# Patient Record
Sex: MALE | Race: WHITE | NOT HISPANIC OR LATINO | Employment: FULL TIME | URBAN - METROPOLITAN AREA
[De-identification: names, ages, dates, MRNs, and addresses within clinical notes are randomized per-mention and may not be internally consistent; named-entity substitution may affect disease eponyms.]

---

## 2023-05-02 PROBLEM — N52.9 ERECTILE DYSFUNCTION: Status: ACTIVE | Noted: 2023-05-02

## 2023-05-02 PROBLEM — I10 HYPERTENSION: Status: ACTIVE | Noted: 2023-05-02

## 2023-05-02 PROBLEM — E55.9 VITAMIN D DEFICIENCY: Status: ACTIVE | Noted: 2023-05-02

## 2023-05-02 PROBLEM — F41.9 ANXIETY DISORDER: Status: ACTIVE | Noted: 2023-05-02

## 2023-05-02 PROBLEM — E78.1 HYPERTRIGLYCERIDEMIA: Status: ACTIVE | Noted: 2023-05-02

## 2023-05-02 PROBLEM — G47.33 OSA (OBSTRUCTIVE SLEEP APNEA): Status: ACTIVE | Noted: 2023-05-02

## 2023-05-02 PROBLEM — J30.9 ALLERGIC RHINITIS: Status: ACTIVE | Noted: 2023-05-02

## 2023-05-02 PROBLEM — F41.0 PANIC ATTACKS: Status: ACTIVE | Noted: 2023-05-02

## 2023-05-02 RX ORDER — CHOLECALCIFEROL (VITAMIN D3) 125 MCG
125 CAPSULE ORAL DAILY
COMMUNITY
Start: 2018-05-31

## 2023-05-02 RX ORDER — AMLODIPINE BESYLATE 10 MG/1
10 TABLET ORAL DAILY
COMMUNITY
End: 2023-05-09 | Stop reason: SDUPTHER

## 2023-05-02 RX ORDER — SILDENAFIL 100 MG/1
100 TABLET, FILM COATED ORAL
COMMUNITY
End: 2023-05-08 | Stop reason: SDUPTHER

## 2023-05-02 RX ORDER — HYDROCHLOROTHIAZIDE 12.5 MG/1
12.5 TABLET ORAL DAILY
COMMUNITY
End: 2023-11-08

## 2023-05-02 RX ORDER — ALPRAZOLAM 0.5 MG/1
0.5 TABLET ORAL 2 TIMES DAILY
COMMUNITY
End: 2023-05-04 | Stop reason: SDUPTHER

## 2023-05-02 RX ORDER — AZELASTINE 1 MG/ML
SPRAY, METERED NASAL
COMMUNITY
Start: 2017-01-03

## 2023-05-02 RX ORDER — SERTRALINE HYDROCHLORIDE 100 MG/1
100 TABLET, FILM COATED ORAL DAILY
COMMUNITY
Start: 2019-08-07 | End: 2024-01-24 | Stop reason: SDUPTHER

## 2023-05-02 RX ORDER — METOPROLOL SUCCINATE 100 MG/1
100 TABLET, EXTENDED RELEASE ORAL DAILY
COMMUNITY
Start: 2017-01-03 | End: 2024-01-24 | Stop reason: SDUPTHER

## 2023-05-02 RX ORDER — LISINOPRIL 20 MG/1
20 TABLET ORAL DAILY
COMMUNITY
End: 2023-07-31 | Stop reason: SDUPTHER

## 2023-05-04 ENCOUNTER — OFFICE VISIT (OUTPATIENT)
Dept: PRIMARY CARE | Facility: CLINIC | Age: 43
End: 2023-05-04
Payer: COMMERCIAL

## 2023-05-04 VITALS
HEIGHT: 69 IN | DIASTOLIC BLOOD PRESSURE: 68 MMHG | BODY MASS INDEX: 39.25 KG/M2 | OXYGEN SATURATION: 96 % | HEART RATE: 69 BPM | WEIGHT: 265 LBS | SYSTOLIC BLOOD PRESSURE: 124 MMHG

## 2023-05-04 DIAGNOSIS — F41.1 GENERALIZED ANXIETY DISORDER: Primary | ICD-10-CM

## 2023-05-04 DIAGNOSIS — F41.0 PANIC ATTACKS: ICD-10-CM

## 2023-05-04 PROCEDURE — 99214 OFFICE O/P EST MOD 30 MIN: CPT | Performed by: INTERNAL MEDICINE

## 2023-05-04 PROCEDURE — 3074F SYST BP LT 130 MM HG: CPT | Performed by: INTERNAL MEDICINE

## 2023-05-04 PROCEDURE — 3078F DIAST BP <80 MM HG: CPT | Performed by: INTERNAL MEDICINE

## 2023-05-04 RX ORDER — ALPRAZOLAM 0.5 MG/1
0.5 TABLET ORAL 2 TIMES DAILY
Qty: 180 TABLET | Refills: 0 | Status: SHIPPED | OUTPATIENT
Start: 2023-05-04 | End: 2023-07-31 | Stop reason: SDUPTHER

## 2023-05-04 ASSESSMENT — ENCOUNTER SYMPTOMS
DYSURIA: 0
LIGHT-HEADEDNESS: 0
HEMATURIA: 0
CONSTIPATION: 0
ARTHRALGIAS: 0
WEAKNESS: 0
DIZZINESS: 0
HEADACHES: 0
FATIGUE: 0
PALPITATIONS: 0
DIFFICULTY URINATING: 0
FREQUENCY: 0
NAUSEA: 0
SHORTNESS OF BREATH: 0
MYALGIAS: 0
SORE THROAT: 0
CHILLS: 0
COUGH: 0
FEVER: 0
VOMITING: 0
DIARRHEA: 0

## 2023-05-04 ASSESSMENT — PATIENT HEALTH QUESTIONNAIRE - PHQ9
1. LITTLE INTEREST OR PLEASURE IN DOING THINGS: NOT AT ALL
2. FEELING DOWN, DEPRESSED OR HOPELESS: NOT AT ALL
SUM OF ALL RESPONSES TO PHQ9 QUESTIONS 1 AND 2: 0

## 2023-05-04 ASSESSMENT — PAIN SCALES - GENERAL: PAINLEVEL: 0-NO PAIN

## 2023-05-04 NOTE — ASSESSMENT & PLAN NOTE
Patient has had generalized anxiety disorder for multiple years.  He is doing well with the alprazolam and after checking the state database a new controlled substance agreement was signed today and he will entered into the same agreement for alprazolam 0.5 twice daily.  I will check a urinalysis or urine screen at a later date when he is not expecting it.

## 2023-05-04 NOTE — PROGRESS NOTES
OARRS:  No data recorded  I have personally reviewed the OARRS report for Obed Uribe. I have considered the risks of abuse, dependence, addiction and diversion    Is the patient prescribed a combination of a benzodiazepine and opioid?  Yes, I feel it is clincially indicated to continue the medication and have discussed with the patient risks/benefits/alternatives.    Last Urine Drug Screen / ordered today: No  Recent Results (from the past 12540 hour(s))   Benzodiazepine Confirmation, Urine    Collection Time: 09/14/22  3:50 PM   Result Value Ref Range    7-Aminoclonazepam <25 Cutoff <25 ng/mL    Alpha-Hydroxyalprazolam 60 (A) Cutoff <25 ng/mL    Alpha-Hydroxymidazolam <25 Cutoff <25 ng/mL    Alprazolam <25 Cutoff <25 ng/mL    Chlordiazepoxide <25 Cutoff <25 ng/mL    Clonazepam <25 Cutoff <25 ng/mL    Diazepam <25 Cutoff <25 ng/mL    Lorazepam <25 Cutoff <25 ng/mL    Midazolam <25 Cutoff <25 ng/mL    Nordiazepam <25 Cutoff <25 ng/mL    Oxazepam <25 Cutoff <25 ng/mL    Temazepam <25 Cutoff <25 ng/mL   Drug Screen, Urine With Reflex to Confirmation    Collection Time: 09/14/22  3:50 PM   Result Value Ref Range    DRUG SCREEN COMMENT URINE SEE BELOW     Amphetamine Screen, Urine PRESUMPTIVE NEGATIVE NEGATIVE    Barbiturate Screen, Urine PRESUMPTIVE NEGATIVE NEGATIVE    BENZODIAZEPINE (PRESENCE) IN URINE BY SCREEN METHOD PRESUMPTIVE POSITIVE (A) NEGATIVE    Cannabinoid Screen, Urine PRESUMPTIVE NEGATIVE NEGATIVE    Cocaine Screen, Urine PRESUMPTIVE NEGATIVE NEGATIVE    Fentanyl, Ur PRESUMPTIVE NEGATIVE NEGATIVE    Methadone Screen, Urine PRESUMPTIVE NEGATIVE NEGATIVE    Opiate Screen, Urine PRESUMPTIVE NEGATIVE NEGATIVE    Oxycodone Screen, Ur PRESUMPTIVE NEGATIVE NEGATIVE    PCP Screen, Urine PRESUMPTIVE NEGATIVE NEGATIVE     N/A    Controlled Substance Agreement:  Date of the Last Agreement: 5/4/2023  Reviewed Controlled Substance Agreement including but not limited to the benefits, risks, and alternatives to  treatment with a Controlled Substance medication(s).    Benzodiazepines:  What is the patient's goal of therapy? To stay calm and productive  Is this being achieved with current treatment? yes    LAMINE-7:  No data recorded    Activities of Daily Living:   Is your overall impression that this patient is benefiting (symptom reduction outweighs side effects) from benzodiazepine therapy? Yes     1. Physical Functioning: Same  2. Family Relationship: Better  3. Social Relationship: Better  4. Mood: Better  5. Sleep Patterns: Better  6. Overall Function: BetterSubjective   Patient ID: Obed Uribe is a 43 y.o. male who presents for CSA refill and general health management.  BN    Patient is here for refill on his alprazolam.  He is in need of a new controlled substance agreement as his has .  I have elected to not check the urine this visit but we will check it at a later visit when he is not expecting it.    Patient has been stable on alprazolam 0.5 mg twice daily for several years before his generalized anxiety disorder and is working well for him.        Review of Systems   Constitutional:  Negative for chills, fatigue and fever.   HENT:  Negative for sore throat.    Eyes:  Negative for visual disturbance.   Respiratory:  Negative for cough and shortness of breath.    Cardiovascular:  Negative for chest pain, palpitations and leg swelling.   Gastrointestinal:  Negative for constipation, diarrhea, nausea and vomiting.   Genitourinary:  Negative for difficulty urinating, dysuria, frequency, hematuria and urgency.   Musculoskeletal:  Negative for arthralgias and myalgias.   Skin:  Negative for rash.   Neurological:  Negative for dizziness, syncope, weakness, light-headedness and headaches.       Objective   Medication Documentation Review Audit       Reviewed by Di Villanueva MA (Medical Assistant) on 23 at 1813      Medication Order Taking? Sig Documenting Provider Last Dose Status   ALPRAZolam (Xanax) 0.5  "mg tablet [id number]  Take 1 tablet (0.5 mg) by mouth 2 times a day. Historical Provider, MD  Active   amLODIPine (Norvasc) 10 mg tablet [id number]  Take 1 tablet (10 mg) by mouth once daily. as directed Historical Provider, MD  Active   azelastine (Astelin) 137 mcg (0.1 %) nasal spray [id number] Yes Administer into affected nostril(s). Historical Provider, MD  Active   cholecalciferol (Vitamin D-3) 125 MCG (5000 UT) capsule [id number] Yes Take 1 capsule (125 mcg) by mouth once daily. Historical Provider, MD  Active   hydroCHLOROthiazide (HYDRODiuril) 12.5 mg tablet [id number]  Take 1 tablet (12.5 mg) by mouth once daily. Historical Provider, MD  Active   lisinopril 20 mg tablet [id number]  Take 1 tablet (20 mg) by mouth once daily. Historical Provider, MD  Active   metoprolol succinate XL (Toprol-XL) 100 mg 24 hr tablet [id number] Yes Take 1 tablet (100 mg) by mouth once daily. Historical Provider, MD  Active   sertraline (Zoloft) 100 mg tablet [id number] Yes Take 1 tablet (100 mg) by mouth once daily. Historical Provider, MD  Active   sildenafil (Viagra) 100 mg tablet [id number]  1 tablet (100 mg). Historical Provider, MD  Active                  Physical Exam  Constitutional:       Appearance: Normal appearance.   HENT:      Head: Normocephalic and atraumatic.      Nose: Nose normal.   Eyes:      Extraocular Movements: Extraocular movements intact.      Pupils: Pupils are equal, round, and reactive to light.   Cardiovascular:      Rate and Rhythm: Normal rate and regular rhythm.   Pulmonary:      Breath sounds: Normal breath sounds.   Abdominal:      General: Abdomen is flat. Bowel sounds are normal.      Palpations: Abdomen is soft.   Musculoskeletal:      Right lower leg: No edema.      Left lower leg: No edema.   Neurological:      Mental Status: He is alert.     /77 (BP Location: Left arm, Patient Position: Sitting)   Pulse 69   Ht 1.753 m (5' 9\")   Wt 120 kg (265 lb)   SpO2 96%   BMI 39.13 kg/m² "       Assessment/Plan   Problem List Items Addressed This Visit    None             It has been a pleasure seeing you.

## 2023-05-06 DIAGNOSIS — N52.9 ERECTILE DYSFUNCTION, UNSPECIFIED ERECTILE DYSFUNCTION TYPE: ICD-10-CM

## 2023-05-08 DIAGNOSIS — N52.9 ERECTILE DYSFUNCTION, UNSPECIFIED ERECTILE DYSFUNCTION TYPE: Primary | ICD-10-CM

## 2023-05-08 RX ORDER — SILDENAFIL 100 MG/1
100 TABLET, FILM COATED ORAL AS NEEDED
Qty: 10 TABLET | Refills: 11 | Status: SHIPPED | OUTPATIENT
Start: 2023-05-08 | End: 2024-05-15

## 2023-05-08 RX ORDER — SILDENAFIL 100 MG/1
TABLET, FILM COATED ORAL
Qty: 9 TABLET | Refills: 0 | OUTPATIENT
Start: 2023-05-08

## 2023-05-08 NOTE — TELEPHONE ENCOUNTER
----- Message from Obed Uribe sent at 2023 12:02 PM EDT -----  Regarding: Sildenafil  Contact: [phone]  Hi Dr. Mancia,   Would you send in a new prescription for sildenafil 100mg to [facility] in [place]? I had a refill available still, but the script was  after a year. Thank you!   Obed Uribe

## 2023-05-09 DIAGNOSIS — I10 HYPERTENSION, UNSPECIFIED TYPE: ICD-10-CM

## 2023-05-09 RX ORDER — AMLODIPINE BESYLATE 10 MG/1
10 TABLET ORAL DAILY
Qty: 30 TABLET | Refills: 0 | Status: SHIPPED | OUTPATIENT
Start: 2023-05-09 | End: 2023-06-27 | Stop reason: SDUPTHER

## 2023-06-27 ENCOUNTER — PATIENT MESSAGE (OUTPATIENT)
Dept: PRIMARY CARE | Facility: CLINIC | Age: 43
End: 2023-06-27

## 2023-06-27 DIAGNOSIS — I10 HYPERTENSION, UNSPECIFIED TYPE: ICD-10-CM

## 2023-06-27 RX ORDER — AMLODIPINE BESYLATE 10 MG/1
10 TABLET ORAL DAILY
Qty: 30 TABLET | Refills: 0 | Status: SHIPPED | OUTPATIENT
Start: 2023-06-27 | End: 2023-07-31 | Stop reason: SDUPTHER

## 2023-06-27 NOTE — TELEPHONE ENCOUNTER
----- Message from Obed Uribe sent at 6/27/2023  1:30 PM EDT -----  Regarding: Amlodipine   Contact: [phone]  Hi Dr. Mancia!   I need another refill of Amlodipine. I have 2 doses left. Usually I get a 90 day supply but the last one was only for 30 days. Also, I will have to reschedule my appointment for July 27. When I made the appointment I didn’t even think about the fact that we will be on our [place] vacation in [place] from July 21-28. So I just need to move it by a day or two after I get back. But I’ll call the office to schedule that. Thank you!   Obed

## 2023-06-27 NOTE — TELEPHONE ENCOUNTER
Patient was also transferred to [facility] To reschedule his appointment on 7/27 due to being on vacation.      Patien need a refill for:    Amlodipine 10 mg 1 tab daily    90 days    [facility] [place]  BN

## 2023-07-27 ENCOUNTER — APPOINTMENT (OUTPATIENT)
Dept: PRIMARY CARE | Facility: CLINIC | Age: 43
End: 2023-07-27
Payer: COMMERCIAL

## 2023-07-31 ENCOUNTER — LAB (OUTPATIENT)
Dept: LAB | Facility: LAB | Age: 43
End: 2023-07-31
Payer: COMMERCIAL

## 2023-07-31 ENCOUNTER — OFFICE VISIT (OUTPATIENT)
Dept: PRIMARY CARE | Facility: CLINIC | Age: 43
End: 2023-07-31
Payer: COMMERCIAL

## 2023-07-31 VITALS
BODY MASS INDEX: 39.46 KG/M2 | HEART RATE: 74 BPM | SYSTOLIC BLOOD PRESSURE: 144 MMHG | DIASTOLIC BLOOD PRESSURE: 86 MMHG | OXYGEN SATURATION: 96 % | WEIGHT: 266.4 LBS | HEIGHT: 69 IN

## 2023-07-31 DIAGNOSIS — F41.1 GENERALIZED ANXIETY DISORDER: ICD-10-CM

## 2023-07-31 DIAGNOSIS — F41.0 PANIC ATTACKS: Primary | ICD-10-CM

## 2023-07-31 DIAGNOSIS — I10 HYPERTENSION, UNSPECIFIED TYPE: ICD-10-CM

## 2023-07-31 PROCEDURE — 3077F SYST BP >= 140 MM HG: CPT | Performed by: INTERNAL MEDICINE

## 2023-07-31 PROCEDURE — 3079F DIAST BP 80-89 MM HG: CPT | Performed by: INTERNAL MEDICINE

## 2023-07-31 PROCEDURE — 80358 DRUG SCREENING METHADONE: CPT

## 2023-07-31 PROCEDURE — 80346 BENZODIAZEPINES1-12: CPT

## 2023-07-31 PROCEDURE — 80368 SEDATIVE HYPNOTICS: CPT

## 2023-07-31 PROCEDURE — 80307 DRUG TEST PRSMV CHEM ANLYZR: CPT

## 2023-07-31 PROCEDURE — 80361 OPIATES 1 OR MORE: CPT

## 2023-07-31 PROCEDURE — 80373 DRUG SCREENING TRAMADOL: CPT

## 2023-07-31 PROCEDURE — 3080F DIAST BP >= 90 MM HG: CPT | Performed by: INTERNAL MEDICINE

## 2023-07-31 PROCEDURE — 80365 DRUG SCREENING OXYCODONE: CPT

## 2023-07-31 PROCEDURE — 99214 OFFICE O/P EST MOD 30 MIN: CPT | Performed by: INTERNAL MEDICINE

## 2023-07-31 PROCEDURE — 80354 DRUG SCREENING FENTANYL: CPT

## 2023-07-31 RX ORDER — LISINOPRIL 20 MG/1
20 TABLET ORAL DAILY
Qty: 90 TABLET | Refills: 3 | Status: SHIPPED | OUTPATIENT
Start: 2023-07-31

## 2023-07-31 RX ORDER — ALPRAZOLAM 0.5 MG/1
0.5 TABLET ORAL 2 TIMES DAILY
Qty: 180 TABLET | Refills: 0 | Status: SHIPPED | OUTPATIENT
Start: 2023-07-31 | End: 2023-10-23 | Stop reason: SDUPTHER

## 2023-07-31 RX ORDER — AMLODIPINE BESYLATE 10 MG/1
10 TABLET ORAL DAILY
Qty: 90 TABLET | Refills: 3 | Status: SHIPPED | OUTPATIENT
Start: 2023-07-31 | End: 2023-10-23 | Stop reason: SDUPTHER

## 2023-07-31 ASSESSMENT — ENCOUNTER SYMPTOMS
SORE THROAT: 0
DYSURIA: 0
HEADACHES: 0
DIFFICULTY URINATING: 0
HEMATURIA: 0
PALPITATIONS: 0
NAUSEA: 0
COUGH: 0
FREQUENCY: 0
CONSTIPATION: 0
LIGHT-HEADEDNESS: 0
ARTHRALGIAS: 0
DIZZINESS: 0
DIARRHEA: 0
FEVER: 0
MYALGIAS: 0
VOMITING: 0
SHORTNESS OF BREATH: 0
CHILLS: 0
WEAKNESS: 0
FATIGUE: 0

## 2023-07-31 ASSESSMENT — PAIN SCALES - GENERAL: PAINLEVEL: 0-NO PAIN

## 2023-07-31 ASSESSMENT — PATIENT HEALTH QUESTIONNAIRE - PHQ9
SUM OF ALL RESPONSES TO PHQ9 QUESTIONS 1 AND 2: 0
2. FEELING DOWN, DEPRESSED OR HOPELESS: NOT AT ALL
1. LITTLE INTEREST OR PLEASURE IN DOING THINGS: NOT AT ALL

## 2023-07-31 NOTE — PROGRESS NOTES
OARRS:  Audrey Mancia MD on 7/31/2023  2:28 PM  I have personally reviewed the OARRS report for Obed Uribe. I have considered the risks of abuse, dependence, addiction and diversion    Is the patient prescribed a combination of a benzodiazepine and opioid?  No    Last Urine Drug Screen / ordered today: Yes  Recent Results (from the past 57658 hour(s))   Benzodiazepine Confirmation, Urine    Collection Time: 09/14/22  3:50 PM   Result Value Ref Range    7-Aminoclonazepam <25 Cutoff <25 ng/mL    Alpha-Hydroxyalprazolam 60 (A) Cutoff <25 ng/mL    Alpha-Hydroxymidazolam <25 Cutoff <25 ng/mL    Alprazolam <25 Cutoff <25 ng/mL    Chlordiazepoxide <25 Cutoff <25 ng/mL    Clonazepam <25 Cutoff <25 ng/mL    Diazepam <25 Cutoff <25 ng/mL    Lorazepam <25 Cutoff <25 ng/mL    Midazolam <25 Cutoff <25 ng/mL    Nordiazepam <25 Cutoff <25 ng/mL    Oxazepam <25 Cutoff <25 ng/mL    Temazepam <25 Cutoff <25 ng/mL   Drug Screen, Urine With Reflex to Confirmation    Collection Time: 09/14/22  3:50 PM   Result Value Ref Range    DRUG SCREEN COMMENT URINE SEE BELOW     Amphetamine Screen, Urine PRESUMPTIVE NEGATIVE NEGATIVE    Barbiturate Screen, Urine PRESUMPTIVE NEGATIVE NEGATIVE    BENZODIAZEPINE (PRESENCE) IN URINE BY SCREEN METHOD PRESUMPTIVE POSITIVE (A) NEGATIVE    Cannabinoid Screen, Urine PRESUMPTIVE NEGATIVE NEGATIVE    Cocaine Screen, Urine PRESUMPTIVE NEGATIVE NEGATIVE    Fentanyl, Ur PRESUMPTIVE NEGATIVE NEGATIVE    Methadone Screen, Urine PRESUMPTIVE NEGATIVE NEGATIVE    Opiate Screen, Urine PRESUMPTIVE NEGATIVE NEGATIVE    Oxycodone Screen, Ur PRESUMPTIVE NEGATIVE NEGATIVE    PCP Screen, Urine PRESUMPTIVE NEGATIVE NEGATIVE     Results are as expected.     Controlled Substance Agreement:  Date of the Last Agreement: 5/4/23  Reviewed Controlled Substance Agreement including but not limited to the benefits, risks, and alternatives to treatment with a Controlled Substance medication(s).    Benzodiazepines:  What is the  patient's goal of therapy? To stay calm  Is this being achieved with current treatment? yes    LAMINE-7:  No data recorded    Activities of Daily Living:   Is your overall impression that this patient is benefiting (symptom reduction outweighs side effects) from benzodiazepine therapy? Yes     1. Physical Functioning: Same  2. Family Relationship: Better  3. Social Relationship: Better  4. Mood: Better  5. Sleep Patterns: Better  6. Overall Function: BetterSubjective   Patient ID: Obed Uribe is a 43 y.o. male who presents for CSA refill and general health management.   BN    Patient is here for refill on his controlled substance agreement.  Patient was recently on vacation in [place] and ran out of his all his medications including amlodipine and lisinopril.  He is still out of them and has been for about 7 to 10 days now.  I told the patient it could be dangerous to run out of medications like that because his blood pressure could still high go so high that he could be in the straight stroke range and encouraged him to call for refills in the future and not let it lapse.  Patient admits he figured he had an upcoming appointment in a few days with heart and did not realize the implications or consequences that could lead to.    Computer is showing confusion about her last controlled substance agreement.  He did sign 1 on May 4 of this year it is in the EMR and can readily be seen.        Review of Systems   Constitutional:  Negative for chills, fatigue and fever.   HENT:  Negative for sore throat.    Eyes:  Negative for visual disturbance.   Respiratory:  Negative for cough and shortness of breath.    Cardiovascular:  Negative for chest pain, palpitations and leg swelling.   Gastrointestinal:  Negative for constipation, diarrhea, nausea and vomiting.   Genitourinary:  Negative for difficulty urinating, dysuria, frequency, hematuria and urgency.   Musculoskeletal:  Negative for arthralgias and myalgias.    Skin:  Negative for rash.   Neurological:  Negative for dizziness, syncope, weakness, light-headedness and headaches.       Objective   Medication Documentation Review Audit       Reviewed by Audrey Mancia MD (Physician) on 07/31/23 at 1425      Medication Order Taking? Sig Documenting Provider Last Dose Status   ALPRAZolam (Xanax) 0.5 mg tablet [id number]  Take 1 tablet (0.5 mg) by mouth 2 times a day. Audrey Mancia MD  Active   amLODIPine (Norvasc) 10 mg tablet [id number]  Take 1 tablet (10 mg) by mouth once daily. as directed Audrey Mancia MD  Active   azelastine (Astelin) 137 mcg (0.1 %) nasal spray [id number]  Administer into affected nostril(s). Historical Provider, MD  Active   cholecalciferol (Vitamin D-3) 125 MCG (5000 UT) capsule [id number]  Take 1 capsule (125 mcg) by mouth once daily. Historical Provider, MD  Active   hydroCHLOROthiazide (HYDRODiuril) 12.5 mg tablet [id number]  Take 1 tablet (12.5 mg) by mouth once daily. Historical Provider, MD  Active   lisinopril 20 mg tablet [id number]  Take 1 tablet (20 mg) by mouth once daily. Historical Provider, MD  Active   metoprolol succinate XL (Toprol-XL) 100 mg 24 hr tablet [id number]  Take 1 tablet (100 mg) by mouth once daily. Historical Provider, MD  Active   sertraline (Zoloft) 100 mg tablet [id number]  Take 1 tablet (100 mg) by mouth once daily. Historical Provider, MD  Active   sildenafil (Viagra) 100 mg tablet [id number]  Take 1 tablet (100 mg) by mouth if needed for erectile dysfunction. Audrey Mancia MD  Active                  Physical Exam  Constitutional:       Appearance: Normal appearance.   HENT:      Head: Normocephalic and atraumatic.      Nose: Nose normal.   Eyes:      Extraocular Movements: Extraocular movements intact.      Pupils: Pupils are equal, round, and reactive to light.   Cardiovascular:      Rate and Rhythm: Normal rate and regular rhythm.   Pulmonary:      Breath sounds: Normal breath sounds.   Abdominal:      General: Abdomen is  "flat. Bowel sounds are normal.      Palpations: Abdomen is soft.   Musculoskeletal:      Right lower leg: No edema.      Left lower leg: No edema.   Neurological:      Mental Status: He is alert.     BP (!) 169/94 (BP Location: Left arm, Patient Position: Sitting)   Pulse 74   Ht 1.753 m (5' 9\")   Wt 121 kg (266 lb 6.4 oz)   SpO2 96%   BMI 39.34 kg/m²       Assessment/Plan   Problem List Items Addressed This Visit       Anxiety disorder     After reviewing the state database patient was given a refill on his benzodiazepine.  He was also given a requisition to check a urine specimen today.  Next controlled substance agreement refill will be 90 days         Relevant Medications    ALPRAZolam (Xanax) 0.5 mg tablet    Other Relevant Orders    Opiate/Opioid/Benzo Extended Prescription Compliance    Hypertension     Patient's blood pressure is quite high today however even after recheck it remained high at 144/86.  He ran out of his meds about 10 days ago so patient was counseled about compliance and not running out of his medications.  I explained that sometimes when his blood pressure could go too high he could actually have a stroke especially when it acutely happens from stopping his medications abruptly.  Patient says in the future reviewed will be more careful to call in for refills and not wait         Relevant Medications    amLODIPine (Norvasc) 10 mg tablet    lisinopril 20 mg tablet    Panic attacks - Primary              It has been a pleasure seeing you.   "

## 2023-07-31 NOTE — ASSESSMENT & PLAN NOTE
After reviewing the state database patient was given a refill on his benzodiazepine.  He was also given a requisition to check a urine specimen today.  Next controlled substance agreement refill will be 90 days

## 2023-07-31 NOTE — ASSESSMENT & PLAN NOTE
Patient's blood pressure is quite high today however even after recheck it remained high at 144/86.  He ran out of his meds about 10 days ago so patient was counseled about compliance and not running out of his medications.  I explained that sometimes when his blood pressure could go too high he could actually have a stroke especially when it acutely happens from stopping his medications abruptly.  Patient says in the future reviewed will be more careful to call in for refills and not wait

## 2023-08-04 LAB
6-ACETYLMORPHINE: <25 NG/ML
7-AMINOCLONAZEPAM: <25 NG/ML
ALPHA-HYDROXYALPRAZOLAM: <25 NG/ML
ALPHA-HYDROXYMIDAZOLAM: <25 NG/ML
ALPRAZOLAM: <25 NG/ML
AMPHETAMINE (PRESENCE) IN URINE BY SCREEN METHOD: NORMAL
BARBITURATES PRESENCE IN URINE BY SCREEN METHOD: NORMAL
CANNABINOIDS IN URINE BY SCREEN METHOD: NORMAL
CHLORDIAZEPOXIDE: <25 NG/ML
CLONAZEPAM: <25 NG/ML
COCAINE (PRESENCE) IN URINE BY SCREEN METHOD: NORMAL
CODEINE: <50 NG/ML
CREATINE, URINE FOR DRUG: 97.2 MG/DL
DIAZEPAM: <25 NG/ML
DRUG SCREEN COMMENT URINE: NORMAL
EDDP: <25 NG/ML
FENTANYL CONFIRMATION, URINE: <2.5 NG/ML
HYDROCODONE: <25 NG/ML
HYDROMORPHONE: <25 NG/ML
LORAZEPAM: <25 NG/ML
METHADONE CONFIRMATION,URINE: <25 NG/ML
MIDAZOLAM: <25 NG/ML
MORPHINE URINE: <50 NG/ML
NORDIAZEPAM: <25 NG/ML
NORFENTANYL: <2.5 NG/ML
NORHYDROCODONE: <25 NG/ML
NOROXYCODONE: <25 NG/ML
O-DESMETHYLTRAMADOL: <50 NG/ML
OXAZEPAM: <25 NG/ML
OXYCODONE: <25 NG/ML
OXYMORPHONE: <25 NG/ML
PHENCYCLIDINE (PRESENCE) IN URINE BY SCREEN METHOD: NORMAL
TEMAZEPAM: <25 NG/ML
TRAMADOL: <50 NG/ML
ZOLPIDEM METABOLITE (ZCA): <25 NG/ML
ZOLPIDEM: <25 NG/ML

## 2023-10-23 ENCOUNTER — OFFICE VISIT (OUTPATIENT)
Dept: PRIMARY CARE | Facility: CLINIC | Age: 43
End: 2023-10-23
Payer: COMMERCIAL

## 2023-10-23 VITALS
SYSTOLIC BLOOD PRESSURE: 122 MMHG | WEIGHT: 274.2 LBS | HEART RATE: 71 BPM | HEIGHT: 69 IN | DIASTOLIC BLOOD PRESSURE: 70 MMHG | OXYGEN SATURATION: 96 % | BODY MASS INDEX: 40.61 KG/M2

## 2023-10-23 DIAGNOSIS — Z23 FLU VACCINE NEED: ICD-10-CM

## 2023-10-23 DIAGNOSIS — Z00.00 ANNUAL PHYSICAL EXAM: Primary | ICD-10-CM

## 2023-10-23 DIAGNOSIS — I10 HYPERTENSION, UNSPECIFIED TYPE: ICD-10-CM

## 2023-10-23 DIAGNOSIS — F41.1 GENERALIZED ANXIETY DISORDER: ICD-10-CM

## 2023-10-23 PROCEDURE — 99214 OFFICE O/P EST MOD 30 MIN: CPT | Performed by: INTERNAL MEDICINE

## 2023-10-23 PROCEDURE — 3078F DIAST BP <80 MM HG: CPT | Performed by: INTERNAL MEDICINE

## 2023-10-23 PROCEDURE — 90686 IIV4 VACC NO PRSV 0.5 ML IM: CPT | Performed by: INTERNAL MEDICINE

## 2023-10-23 PROCEDURE — 90471 IMMUNIZATION ADMIN: CPT | Performed by: INTERNAL MEDICINE

## 2023-10-23 PROCEDURE — 3074F SYST BP LT 130 MM HG: CPT | Performed by: INTERNAL MEDICINE

## 2023-10-23 RX ORDER — AMLODIPINE BESYLATE 10 MG/1
10 TABLET ORAL DAILY
Qty: 90 TABLET | Refills: 3 | Status: SHIPPED | OUTPATIENT
Start: 2023-10-23

## 2023-10-23 RX ORDER — ALPRAZOLAM 0.5 MG/1
0.5 TABLET ORAL 2 TIMES DAILY
Qty: 180 TABLET | Refills: 0 | Status: SHIPPED | OUTPATIENT
Start: 2023-10-23 | End: 2024-01-24 | Stop reason: SDUPTHER

## 2023-10-23 ASSESSMENT — ENCOUNTER SYMPTOMS
SHORTNESS OF BREATH: 0
FREQUENCY: 0
VOMITING: 0
PALPITATIONS: 0
ARTHRALGIAS: 0
CHILLS: 0
LIGHT-HEADEDNESS: 0
DIFFICULTY URINATING: 0
SORE THROAT: 0
FEVER: 0
HEADACHES: 0
DIARRHEA: 0
DIZZINESS: 0
NAUSEA: 0
COUGH: 0
WEAKNESS: 0
HEMATURIA: 0
MYALGIAS: 0
CONSTIPATION: 0
FATIGUE: 0
DYSURIA: 0

## 2023-10-23 ASSESSMENT — PATIENT HEALTH QUESTIONNAIRE - PHQ9
SUM OF ALL RESPONSES TO PHQ9 QUESTIONS 1 AND 2: 0
1. LITTLE INTEREST OR PLEASURE IN DOING THINGS: NOT AT ALL
2. FEELING DOWN, DEPRESSED OR HOPELESS: NOT AT ALL

## 2023-10-23 ASSESSMENT — PAIN SCALES - GENERAL: PAINLEVEL: 0-NO PAIN

## 2023-10-23 NOTE — PROGRESS NOTES
OARRS:  Audrey Mancia MD on 10/23/2023  3:34 PM  I have personally reviewed the OARRS report for Obed Uribe. I have considered the risks of abuse, dependence, addiction and diversion    Is the patient prescribed a combination of a benzodiazepine and opioid?  No    Last Urine Drug Screen / ordered today: Yes  Recent Results (from the past 8760 hour(s))   OPIATE/OPIOID/BENZO PRESCRIPTION COMPLIANCE    Collection Time: 07/31/23  2:52 PM   Result Value Ref Range    DRUG SCREEN COMMENT URINE SEE BELOW     Creatine, Urine 97.2 mg/dL    Amphetamine Screen, Urine PRESUMPTIVE NEGATIVE NEGATIVE    Barbiturate Screen, Urine PRESUMPTIVE NEGATIVE NEGATIVE    Cannabinoid Screen, Urine PRESUMPTIVE NEGATIVE NEGATIVE    Cocaine Screen, Urine PRESUMPTIVE NEGATIVE NEGATIVE    PCP Screen, Urine PRESUMPTIVE NEGATIVE NEGATIVE    7-Aminoclonazepam <25 Cutoff <25 ng/mL    Alpha-Hydroxyalprazolam <25 Cutoff <25 ng/mL    Alpha-Hydroxymidazolam <25 Cutoff <25 ng/mL    Alprazolam <25 Cutoff <25 ng/mL    Chlordiazepoxide <25 Cutoff <25 ng/mL    Clonazepam <25 Cutoff <25 ng/mL    Diazepam <25 Cutoff <25 ng/mL    Lorazepam <25 Cutoff <25 ng/mL    Midazolam <25 Cutoff <25 ng/mL    Nordiazepam <25 Cutoff <25 ng/mL    Oxazepam <25 Cutoff <25 ng/mL    Temazepam <25 Cutoff <25 ng/mL    Zolpidem <25 Cutoff <25 ng/mL    Zolpidem Metabolite (ZCA) <25 Cutoff <25 ng/mL    6-Acetylmorphine <25 Cutoff <25 ng/mL    Codeine <50 Cutoff <50 ng/mL    Hydrocodone <25 Cutoff <25 ng/mL    Hydromorphone <25 Cutoff <25 ng/mL    Morphine Urine <50 Cutoff <50 ng/mL    Norhydrocodone <25 Cutoff <25 ng/mL    Noroxycodone <25 Cutoff <25 ng/mL    Oxycodone <25 Cutoff <25 ng/mL    Oxymorphone <25 Cutoff <25 ng/mL    Tramadol <50 Cutoff <50 ng/mL    O-Desmethyltramadol <50 Cutoff <50 ng/mL    Fentanyl <2.5 Cutoff<2.5 ng/mL    Norfentanyl <2.5 Cutoff<2.5 ng/mL    METHADONE CONFIRMATION,URINE <25 Cutoff <25 ng/mL    EDDP <25 Cutoff <25 ng/mL     Results are as expected.          Controlled Substance Agreement:  Date of the Last Agreement: 5/4/2023  Reviewed Controlled Substance Agreement including but not limited to the benefits, risks, and alternatives to treatment with a Controlled Substance medication(s).    Benzodiazepines:  What is the patient's goal of therapy?To control anxiety  Is this being achieved with current treatment? yes    LAMINE-7:  No data recorded    Activities of Daily Living:   Is your overall impression that this patient is benefiting (symptom reduction outweighs side effects) from benzodiazepine therapy? Yes     1. Physical Functioning: Same  2. Family Relationship: Better  3. Social Relationship: Better  4. Mood: Better  5. Sleep Patterns: Better  6. Overall Function: BetterSubjective   Patient ID: Obed Uribe is a 43 y.o. male who presents for 3 month follow up for HTN management.  BN    Patient is here today for recheck on hypertension, get a flu shot and refill on his alprazolam.  Technically his alprazolam due in about 10 days or November 4 but we will give it to him earlier today.  Patient has already been in agreement with his controlled substance agreement and had a repeat urine done in July which was as expected.        Review of Systems   Constitutional:  Negative for chills, fatigue and fever.   HENT:  Negative for sore throat.    Eyes:  Negative for visual disturbance.   Respiratory:  Negative for cough and shortness of breath.    Cardiovascular:  Negative for chest pain, palpitations and leg swelling.   Gastrointestinal:  Negative for constipation, diarrhea, nausea and vomiting.   Genitourinary:  Negative for difficulty urinating, dysuria, frequency, hematuria and urgency.   Musculoskeletal:  Negative for arthralgias and myalgias.   Skin:  Negative for rash.   Neurological:  Negative for dizziness, syncope, weakness, light-headedness and headaches.       Objective   Medication Documentation Review Audit       Reviewed by Audrey Mancia MD  (Physician) on 07/31/23 at 1425      Medication Order Taking? Sig Documenting Provider Last Dose Status   ALPRAZolam (Xanax) 0.5 mg tablet [id number]  Take 1 tablet (0.5 mg) by mouth 2 times a day. Audrey Mancia MD  Active   amLODIPine (Norvasc) 10 mg tablet [id number]  Take 1 tablet (10 mg) by mouth once daily. as directed Audrey Mancia MD  Active   azelastine (Astelin) 137 mcg (0.1 %) nasal spray [id number]  Administer into affected nostril(s). Historical Provider, MD  Active   cholecalciferol (Vitamin D-3) 125 MCG (5000 UT) capsule [id number]  Take 1 capsule (125 mcg) by mouth once daily. Historical Provider, MD  Active   hydroCHLOROthiazide (HYDRODiuril) 12.5 mg tablet [id number]  Take 1 tablet (12.5 mg) by mouth once daily. Historical Provider, MD  Active   lisinopril 20 mg tablet [id number]  Take 1 tablet (20 mg) by mouth once daily. Historical Provider, MD  Active   metoprolol succinate XL (Toprol-XL) 100 mg 24 hr tablet [id number]  Take 1 tablet (100 mg) by mouth once daily. Historical Provider, MD  Active   sertraline (Zoloft) 100 mg tablet [id number]  Take 1 tablet (100 mg) by mouth once daily. Historical Provider, MD  Active   sildenafil (Viagra) 100 mg tablet [id number]  Take 1 tablet (100 mg) by mouth if needed for erectile dysfunction. Audrey Mancia MD  Active                  No Known Allergies  Physical Exam  Constitutional:       Appearance: Normal appearance.   HENT:      Head: Normocephalic and atraumatic.      Nose: Nose normal.   Eyes:      Extraocular Movements: Extraocular movements intact.      Pupils: Pupils are equal, round, and reactive to light.   Cardiovascular:      Rate and Rhythm: Normal rate and regular rhythm.   Pulmonary:      Breath sounds: Normal breath sounds.   Abdominal:      General: Abdomen is flat. Bowel sounds are normal.      Palpations: Abdomen is soft.   Musculoskeletal:      Right lower leg: No edema.      Left lower leg: No edema.   Neurological:      Mental Status: He is alert.  "    /76 (BP Location: Left arm, Patient Position: Sitting)   Pulse 71   Ht 1.753 m (5' 9\") Comment: with shoes on  Wt 124 kg (274 lb 3.2 oz)   SpO2 96%   BMI 40.49 kg/m²       Assessment/Plan   Problem List Items Addressed This Visit    None             It has been a pleasure seeing you.  Di Villanueva MA    "

## 2023-10-23 NOTE — PATIENT INSTRUCTIONS
Follow up Dr Mancia the week before 2/4/2024    Get fasting labs in January before next appointment

## 2023-10-23 NOTE — ASSESSMENT & PLAN NOTE
Anxiety disorder is stable and he is doing well he was given a refill on his alprazolam today and will follow-up in 90 days or the week before February 4 for his next refill.    He is due for annual blood work prior to that was given a requisition today and asked to get labs in January before his February appointment.

## 2024-01-24 ENCOUNTER — OFFICE VISIT (OUTPATIENT)
Dept: PRIMARY CARE | Facility: CLINIC | Age: 44
End: 2024-01-24
Payer: COMMERCIAL

## 2024-01-24 ENCOUNTER — LAB (OUTPATIENT)
Dept: LAB | Facility: LAB | Age: 44
End: 2024-01-24
Payer: COMMERCIAL

## 2024-01-24 VITALS
WEIGHT: 275.4 LBS | OXYGEN SATURATION: 98 % | HEART RATE: 72 BPM | SYSTOLIC BLOOD PRESSURE: 117 MMHG | HEIGHT: 69 IN | DIASTOLIC BLOOD PRESSURE: 73 MMHG | BODY MASS INDEX: 40.79 KG/M2

## 2024-01-24 DIAGNOSIS — F41.0 PANIC ATTACKS: Primary | ICD-10-CM

## 2024-01-24 DIAGNOSIS — D17.1 LIPOMA OF TORSO: ICD-10-CM

## 2024-01-24 DIAGNOSIS — F41.1 GENERALIZED ANXIETY DISORDER: ICD-10-CM

## 2024-01-24 DIAGNOSIS — I15.9 SECONDARY HYPERTENSION: ICD-10-CM

## 2024-01-24 DIAGNOSIS — Z00.00 ANNUAL PHYSICAL EXAM: ICD-10-CM

## 2024-01-24 DIAGNOSIS — F41.9 ANXIETY DISORDER, UNSPECIFIED TYPE: ICD-10-CM

## 2024-01-24 LAB
25(OH)D3 SERPL-MCNC: 26 NG/ML (ref 30–100)
ALBUMIN SERPL BCP-MCNC: 4.5 G/DL (ref 3.4–5)
ALP SERPL-CCNC: 82 U/L (ref 33–120)
ALT SERPL W P-5'-P-CCNC: 33 U/L (ref 10–52)
ANION GAP SERPL CALC-SCNC: 10 MMOL/L (ref 10–20)
AST SERPL W P-5'-P-CCNC: 23 U/L (ref 9–39)
BILIRUB SERPL-MCNC: 0.7 MG/DL (ref 0–1.2)
BUN SERPL-MCNC: 22 MG/DL (ref 6–23)
CALCIUM SERPL-MCNC: 9.6 MG/DL (ref 8.6–10.6)
CHLORIDE SERPL-SCNC: 103 MMOL/L (ref 98–107)
CHOLEST SERPL-MCNC: 176 MG/DL (ref 0–199)
CHOLESTEROL/HDL RATIO: 6.8
CO2 SERPL-SCNC: 29 MMOL/L (ref 21–32)
CREAT SERPL-MCNC: 1.04 MG/DL (ref 0.5–1.3)
EGFRCR SERPLBLD CKD-EPI 2021: >90 ML/MIN/1.73M*2
ERYTHROCYTE [DISTWIDTH] IN BLOOD BY AUTOMATED COUNT: 12.5 % (ref 11.5–14.5)
GLUCOSE SERPL-MCNC: 95 MG/DL (ref 74–99)
HCT VFR BLD AUTO: 40.8 % (ref 41–52)
HDLC SERPL-MCNC: 25.7 MG/DL
HGB BLD-MCNC: 13.7 G/DL (ref 13.5–17.5)
LDLC SERPL CALC-MCNC: 85 MG/DL
MCH RBC QN AUTO: 29.8 PG (ref 26–34)
MCHC RBC AUTO-ENTMCNC: 33.6 G/DL (ref 32–36)
MCV RBC AUTO: 89 FL (ref 80–100)
NON HDL CHOLESTEROL: 150 MG/DL (ref 0–149)
NRBC BLD-RTO: 0 /100 WBCS (ref 0–0)
PLATELET # BLD AUTO: 173 X10*3/UL (ref 150–450)
POTASSIUM SERPL-SCNC: 4.2 MMOL/L (ref 3.5–5.3)
PROT SERPL-MCNC: 7.3 G/DL (ref 6.4–8.2)
RBC # BLD AUTO: 4.59 X10*6/UL (ref 4.5–5.9)
SODIUM SERPL-SCNC: 138 MMOL/L (ref 136–145)
TRIGL SERPL-MCNC: 328 MG/DL (ref 0–149)
TSH SERPL-ACNC: 1.88 MIU/L (ref 0.44–3.98)
VLDL: 66 MG/DL (ref 0–40)
WBC # BLD AUTO: 6 X10*3/UL (ref 4.4–11.3)

## 2024-01-24 PROCEDURE — 80061 LIPID PANEL: CPT

## 2024-01-24 PROCEDURE — 3074F SYST BP LT 130 MM HG: CPT | Performed by: INTERNAL MEDICINE

## 2024-01-24 PROCEDURE — 80053 COMPREHEN METABOLIC PANEL: CPT

## 2024-01-24 PROCEDURE — 85027 COMPLETE CBC AUTOMATED: CPT

## 2024-01-24 PROCEDURE — 3078F DIAST BP <80 MM HG: CPT | Performed by: INTERNAL MEDICINE

## 2024-01-24 PROCEDURE — 36415 COLL VENOUS BLD VENIPUNCTURE: CPT

## 2024-01-24 PROCEDURE — 99214 OFFICE O/P EST MOD 30 MIN: CPT | Performed by: INTERNAL MEDICINE

## 2024-01-24 PROCEDURE — 82306 VITAMIN D 25 HYDROXY: CPT

## 2024-01-24 PROCEDURE — 84443 ASSAY THYROID STIM HORMONE: CPT

## 2024-01-24 RX ORDER — HYDROCHLOROTHIAZIDE 12.5 MG/1
12.5 TABLET ORAL DAILY
Qty: 90 TABLET | Refills: 3 | Status: SHIPPED | OUTPATIENT
Start: 2024-01-24 | End: 2024-04-17 | Stop reason: SDUPTHER

## 2024-01-24 RX ORDER — SERTRALINE HYDROCHLORIDE 100 MG/1
100 TABLET, FILM COATED ORAL DAILY
Qty: 90 TABLET | Refills: 3 | Status: SHIPPED | OUTPATIENT
Start: 2024-01-24

## 2024-01-24 RX ORDER — ALPRAZOLAM 0.5 MG/1
0.5 TABLET ORAL 2 TIMES DAILY
Qty: 180 TABLET | Refills: 0 | Status: SHIPPED | OUTPATIENT
Start: 2024-01-24 | End: 2024-04-22 | Stop reason: SDUPTHER

## 2024-01-24 RX ORDER — METOPROLOL SUCCINATE 100 MG/1
100 TABLET, EXTENDED RELEASE ORAL DAILY
Qty: 90 TABLET | Refills: 3 | Status: SHIPPED | OUTPATIENT
Start: 2024-01-24

## 2024-01-24 ASSESSMENT — ENCOUNTER SYMPTOMS
FATIGUE: 0
WEAKNESS: 0
NAUSEA: 0
ARTHRALGIAS: 0
DYSURIA: 0
FREQUENCY: 0
FEVER: 0
SORE THROAT: 0
PALPITATIONS: 0
DIFFICULTY URINATING: 0
DIARRHEA: 0
DIZZINESS: 0
CONSTIPATION: 0
LIGHT-HEADEDNESS: 0
SHORTNESS OF BREATH: 0
COUGH: 0
HEMATURIA: 0
NERVOUS/ANXIOUS: 1
HEADACHES: 0
CHILLS: 0
MYALGIAS: 0
VOMITING: 0

## 2024-01-24 ASSESSMENT — PAIN SCALES - GENERAL: PAINLEVEL: 0-NO PAIN

## 2024-01-24 NOTE — PROGRESS NOTES
OARRS:  Audrey Mancia MD on 1/24/2024  1:36 PM  I have personally reviewed the OARRS report for Obed Uribe. I have considered the risks of abuse, dependence, addiction and diversion    Is the patient prescribed a combination of a benzodiazepine and opioid?  Yes, I feel it is clincially indicated to continue the medication and have discussed with the patient risks/benefits/alternatives.    Last Urine Drug Screen / ordered today: Yes  Recent Results (from the past 8760 hour(s))   OPIATE/OPIOID/BENZO PRESCRIPTION COMPLIANCE    Collection Time: 07/31/23  2:52 PM   Result Value Ref Range    DRUG SCREEN COMMENT URINE SEE BELOW     Creatine, Urine 97.2 mg/dL    Amphetamine Screen, Urine PRESUMPTIVE NEGATIVE NEGATIVE    Barbiturate Screen, Urine PRESUMPTIVE NEGATIVE NEGATIVE    Cannabinoid Screen, Urine PRESUMPTIVE NEGATIVE NEGATIVE    Cocaine Screen, Urine PRESUMPTIVE NEGATIVE NEGATIVE    PCP Screen, Urine PRESUMPTIVE NEGATIVE NEGATIVE    7-Aminoclonazepam <25 Cutoff <25 ng/mL    Alpha-Hydroxyalprazolam <25 Cutoff <25 ng/mL    Alpha-Hydroxymidazolam <25 Cutoff <25 ng/mL    Alprazolam <25 Cutoff <25 ng/mL    Chlordiazepoxide <25 Cutoff <25 ng/mL    Clonazepam <25 Cutoff <25 ng/mL    Diazepam <25 Cutoff <25 ng/mL    Lorazepam <25 Cutoff <25 ng/mL    Midazolam <25 Cutoff <25 ng/mL    Nordiazepam <25 Cutoff <25 ng/mL    Oxazepam <25 Cutoff <25 ng/mL    Temazepam <25 Cutoff <25 ng/mL    Zolpidem <25 Cutoff <25 ng/mL    Zolpidem Metabolite (ZCA) <25 Cutoff <25 ng/mL    6-Acetylmorphine <25 Cutoff <25 ng/mL    Codeine <50 Cutoff <50 ng/mL    Hydrocodone <25 Cutoff <25 ng/mL    Hydromorphone <25 Cutoff <25 ng/mL    Morphine Urine <50 Cutoff <50 ng/mL    Norhydrocodone <25 Cutoff <25 ng/mL    Noroxycodone <25 Cutoff <25 ng/mL    Oxycodone <25 Cutoff <25 ng/mL    Oxymorphone <25 Cutoff <25 ng/mL    Tramadol <50 Cutoff <50 ng/mL    O-Desmethyltramadol <50 Cutoff <50 ng/mL    Fentanyl <2.5 Cutoff<2.5 ng/mL    Norfentanyl <2.5  Cutoff<2.5 ng/mL    METHADONE CONFIRMATION,URINE <25 Cutoff <25 ng/mL    EDDP <25 Cutoff <25 ng/mL     Results are as expected.         Controlled Substance Agreement:  Date of the Last Agreement: 5/4/2023  Reviewed Controlled Substance Agreement including but not limited to the benefits, risks, and alternatives to treatment with a Controlled Substance medication(s).    Benzodiazepines:  What is the patient's goal of therapy? To avoid pannic attacks  Is this being achieved with current treatment? Yes      LAMINE-7:  No data recorded    Activities of Daily Living:   Is your overall impression that this patient is benefiting (symptom reduction outweighs side effects) from benzodiazepine therapy? Yes     1. Physical Functioning: Same  2. Family Relationship: Better  3. Social Relationship: Better  4. Mood: Better  5. Sleep Patterns: Better  6. Overall Function: BetterSubjective   Patient ID: Obed Uribe is a 44 y.o. male who presents for follow up for HTN management.  BN    Patient is here for his hypertension, anxiety refill on his alprazolam and he has something in his left groin he would like me to look at it.  He has a mass in the left groin which has been present for at least 8 years.  It is not bulging out, it does not harm him but when his wife became aware of it she insisted that I look at it to confirm that it was indeed benign.        Review of Systems   Constitutional:  Negative for chills, fatigue and fever.   HENT:  Negative for sore throat.    Eyes:  Negative for visual disturbance.   Respiratory:  Negative for cough and shortness of breath.    Cardiovascular:  Negative for chest pain, palpitations and leg swelling.   Gastrointestinal:  Negative for constipation, diarrhea, nausea and vomiting.   Genitourinary:  Negative for difficulty urinating, dysuria, frequency, hematuria and urgency.   Musculoskeletal:  Negative for arthralgias and myalgias.   Skin:  Negative for rash.   Neurological:  Negative for  dizziness, syncope, weakness, light-headedness and headaches.   Psychiatric/Behavioral:  The patient is nervous/anxious.        Objective   Medication Documentation Review Audit       Reviewed by Audrey Mancia MD (Physician) on 01/24/24 at 1332      Medication Order Taking? Sig Documenting Provider Last Dose Status   ALPRAZolam (Xanax) 0.5 mg tablet [id number]  Take 1 tablet (0.5 mg) by mouth 2 times a day. Audrey Mancia MD  Active   amLODIPine (Norvasc) 10 mg tablet [id number]  Take 1 tablet (10 mg) by mouth once daily. as directed Audrey Mancia MD  Active   azelastine (Astelin) 137 mcg (0.1 %) nasal spray [id number]  Administer into affected nostril(s). Historical Provider, MD  Active   cholecalciferol (Vitamin D-3) 125 MCG (5000 UT) capsule [id number]  Take 1 capsule (125 mcg) by mouth once daily. Historical Provider, MD  Active   hydroCHLOROthiazide (HYDRODiuril) 12.5 mg tablet [id number]  Take 1 tablet by mouth once daily Audrey Mancia MD  Active   lisinopril 20 mg tablet [id number]  Take 1 tablet (20 mg) by mouth once daily. Audrey Mancia MD  Active   metoprolol succinate XL (Toprol-XL) 100 mg 24 hr tablet [id number]  Take 1 tablet (100 mg) by mouth once daily. Historical Provider, MD  Active   sertraline (Zoloft) 100 mg tablet [id number]  Take 1 tablet (100 mg) by mouth once daily. Historical Provider, MD  Active   sildenafil (Viagra) 100 mg tablet [id number]  Take 1 tablet (100 mg) by mouth if needed for erectile dysfunction. Audrey Mancia MD  Active                  No Known Allergies  Physical Exam  Constitutional:       Appearance: Normal appearance.   HENT:      Head: Normocephalic and atraumatic.      Nose: Nose normal.   Eyes:      Extraocular Movements: Extraocular movements intact.      Pupils: Pupils are equal, round, and reactive to light.   Cardiovascular:      Rate and Rhythm: Normal rate and regular rhythm.   Pulmonary:      Breath sounds: Normal breath sounds.   Abdominal:      General: Abdomen is  "flat. Bowel sounds are normal.      Palpations: Abdomen is soft.      Comments: She has an area just above the inguinal line in the left groin that appears to have a small mass in it.  Superficially it appears to be about 1-1/2 x 1 cm in size however when palpated deeper it is clearly much larger and probably about 2 x 4 cm in size.  It is not bulging is not changing with increased abdominal pressure and it appears to be a simple lipoma.  Patient states it has been present for years and not changed in size, does not bulge out and does not cause him any symptoms.   Musculoskeletal:      Right lower leg: No edema.      Left lower leg: No edema.   Neurological:      Mental Status: He is alert.     /73 (BP Location: Left arm, Patient Position: Sitting)   Pulse 72   Ht 1.753 m (5' 9\") Comment: with shoes on  Wt 125 kg (275 lb 6.4 oz)   SpO2 98%   BMI 40.67 kg/m²       Assessment/Plan   Problem List Items Addressed This Visit       Anxiety disorder     Overall anxiety is controlled with the sertraline but he uses alprazolam once or twice a day to avoid panic attacks.  He remained stable on the medication, urine was completed in July and after State database was reviewed he was given a refill on his alprazolam.         Relevant Medications    sertraline (Zoloft) 100 mg tablet    ALPRAZolam (Xanax) 0.5 mg tablet    Hypertension     Blood pressure stable and well-controlled therefore he was given refills on metoprolol hydrochlorothiazide.  Annual blood work was done in October         Relevant Medications    metoprolol succinate XL (Toprol-XL) 100 mg 24 hr tablet    hydroCHLOROthiazide (HYDRODiuril) 12.5 mg tablet    Panic attacks - Primary    Relevant Medications    sertraline (Zoloft) 100 mg tablet    Lipoma of torso     Left lower quadrant of the abdomen in the groin patient has a palpable mass which is a presumed lipoma as it does not seem to change in size with abdominal pressure like hernia would.      "              It has been a pleasure seeing you.  Audrey Mancia MD

## 2024-01-24 NOTE — ASSESSMENT & PLAN NOTE
Blood pressure stable and well-controlled therefore he was given refills on metoprolol hydrochlorothiazide.  Annual blood work was done in October

## 2024-01-24 NOTE — ASSESSMENT & PLAN NOTE
Left lower quadrant of the abdomen in the groin patient has a palpable mass which is a presumed lipoma as it does not seem to change in size with abdominal pressure like hernia would.

## 2024-01-24 NOTE — ASSESSMENT & PLAN NOTE
Overall anxiety is controlled with the sertraline but he uses alprazolam once or twice a day to avoid panic attacks.  He remained stable on the medication, urine was completed in July and after State database was reviewed he was given a refill on his alprazolam.

## 2024-01-25 ENCOUNTER — TELEPHONE (OUTPATIENT)
Dept: PRIMARY CARE | Facility: CLINIC | Age: 44
End: 2024-01-25
Payer: COMMERCIAL

## 2024-01-25 NOTE — TELEPHONE ENCOUNTER
----- Message from Audrey Mancia MD sent at 1/25/2024  7:28 AM EST -----  Please notify patient overall his labs look good but his vitamin D is still low.  Please make sure he is taking at least 5000 international units which equals 125 mcg daily.  Please make sure this is on the med list as well.

## 2024-01-29 ENCOUNTER — APPOINTMENT (OUTPATIENT)
Dept: PRIMARY CARE | Facility: CLINIC | Age: 44
End: 2024-01-29
Payer: COMMERCIAL

## 2024-02-02 DIAGNOSIS — F41.0 PANIC ATTACKS: ICD-10-CM

## 2024-02-02 DIAGNOSIS — F41.9 ANXIETY DISORDER, UNSPECIFIED TYPE: ICD-10-CM

## 2024-02-02 DIAGNOSIS — I15.9 SECONDARY HYPERTENSION: ICD-10-CM

## 2024-02-02 RX ORDER — METOPROLOL SUCCINATE 100 MG/1
100 TABLET, EXTENDED RELEASE ORAL DAILY
Qty: 90 TABLET | Refills: 3 | OUTPATIENT
Start: 2024-02-02

## 2024-02-02 RX ORDER — SERTRALINE HYDROCHLORIDE 100 MG/1
100 TABLET, FILM COATED ORAL DAILY
Qty: 90 TABLET | Refills: 3 | OUTPATIENT
Start: 2024-02-02

## 2024-04-17 DIAGNOSIS — I15.9 SECONDARY HYPERTENSION: ICD-10-CM

## 2024-04-17 RX ORDER — HYDROCHLOROTHIAZIDE 12.5 MG/1
12.5 TABLET ORAL DAILY
Qty: 90 TABLET | Refills: 0 | Status: SHIPPED | OUTPATIENT
Start: 2024-04-17

## 2024-04-17 NOTE — TELEPHONE ENCOUNTER
Please refill.    hydroCHLOROthiazide (HYDRODiuril) 12.5 mg tablet   Take 1 tablet (12.5 mg) by mouth once daily   [facility]

## 2024-04-22 ENCOUNTER — OFFICE VISIT (OUTPATIENT)
Dept: PRIMARY CARE | Facility: CLINIC | Age: 44
End: 2024-04-22
Payer: COMMERCIAL

## 2024-04-22 VITALS
SYSTOLIC BLOOD PRESSURE: 119 MMHG | DIASTOLIC BLOOD PRESSURE: 71 MMHG | WEIGHT: 278 LBS | HEART RATE: 75 BPM | BODY MASS INDEX: 41.18 KG/M2 | HEIGHT: 69 IN | OXYGEN SATURATION: 98 %

## 2024-04-22 DIAGNOSIS — I10 PRIMARY HYPERTENSION: Primary | ICD-10-CM

## 2024-04-22 DIAGNOSIS — F41.1 GENERALIZED ANXIETY DISORDER: ICD-10-CM

## 2024-04-22 DIAGNOSIS — F41.0 PANIC ATTACKS: ICD-10-CM

## 2024-04-22 PROCEDURE — 3078F DIAST BP <80 MM HG: CPT | Performed by: INTERNAL MEDICINE

## 2024-04-22 PROCEDURE — 3074F SYST BP LT 130 MM HG: CPT | Performed by: INTERNAL MEDICINE

## 2024-04-22 PROCEDURE — 99214 OFFICE O/P EST MOD 30 MIN: CPT | Performed by: INTERNAL MEDICINE

## 2024-04-22 RX ORDER — ALPRAZOLAM 0.5 MG/1
0.5 TABLET ORAL 2 TIMES DAILY
Qty: 180 TABLET | Refills: 0 | Status: SHIPPED | OUTPATIENT
Start: 2024-04-22

## 2024-04-22 ASSESSMENT — ENCOUNTER SYMPTOMS
SHORTNESS OF BREATH: 0
ARTHRALGIAS: 0
FREQUENCY: 0
CONSTIPATION: 0
LIGHT-HEADEDNESS: 0
VOMITING: 0
FEVER: 0
NAUSEA: 0
DIFFICULTY URINATING: 0
PALPITATIONS: 0
WEAKNESS: 0
FATIGUE: 0
COUGH: 0
HEMATURIA: 0
DIARRHEA: 0
CHILLS: 0
DYSURIA: 0
DIZZINESS: 0
MYALGIAS: 0
HEADACHES: 0
SORE THROAT: 0

## 2024-04-22 ASSESSMENT — PAIN SCALES - GENERAL: PAINLEVEL: 0-NO PAIN

## 2024-04-22 NOTE — ASSESSMENT & PLAN NOTE
Anxiety in general is stable however he does admit to extra stress because of the advancing age of his wife's grandmother was 99 and now on hospice.  The family spending extra time with her to help out which puts stress on the family and on his wife.  After reviewing the state database and answering questions patient was given a refill    Patient will need a new controlled substance agreement at his next refill in 90 days

## 2024-04-22 NOTE — PROGRESS NOTES
OARRS:  Audrey Mancia MD on 4/22/2024  8:56 AM  I have personally reviewed the OARRS report for Obed Uribe. I have considered the risks of abuse, dependence, addiction and diversion    Is the patient prescribed a combination of a benzodiazepine and opioid?  No    Last Urine Drug Screen / ordered today: Yes  Recent Results (from the past 8760 hour(s))   OPIATE/OPIOID/BENZO PRESCRIPTION COMPLIANCE    Collection Time: 07/31/23  2:52 PM   Result Value Ref Range    DRUG SCREEN COMMENT URINE SEE BELOW     Creatine, Urine 97.2 mg/dL    Amphetamine Screen, Urine PRESUMPTIVE NEGATIVE NEGATIVE    Barbiturate Screen, Urine PRESUMPTIVE NEGATIVE NEGATIVE    Cannabinoid Screen, Urine PRESUMPTIVE NEGATIVE NEGATIVE    Cocaine Screen, Urine PRESUMPTIVE NEGATIVE NEGATIVE    PCP Screen, Urine PRESUMPTIVE NEGATIVE NEGATIVE    7-Aminoclonazepam <25 Cutoff <25 ng/mL    Alpha-Hydroxyalprazolam <25 Cutoff <25 ng/mL    Alpha-Hydroxymidazolam <25 Cutoff <25 ng/mL    Alprazolam <25 Cutoff <25 ng/mL    Chlordiazepoxide <25 Cutoff <25 ng/mL    Clonazepam <25 Cutoff <25 ng/mL    Diazepam <25 Cutoff <25 ng/mL    Lorazepam <25 Cutoff <25 ng/mL    Midazolam <25 Cutoff <25 ng/mL    Nordiazepam <25 Cutoff <25 ng/mL    Oxazepam <25 Cutoff <25 ng/mL    Temazepam <25 Cutoff <25 ng/mL    Zolpidem <25 Cutoff <25 ng/mL    Zolpidem Metabolite (ZCA) <25 Cutoff <25 ng/mL    6-Acetylmorphine <25 Cutoff <25 ng/mL    Codeine <50 Cutoff <50 ng/mL    Hydrocodone <25 Cutoff <25 ng/mL    Hydromorphone <25 Cutoff <25 ng/mL    Morphine Urine <50 Cutoff <50 ng/mL    Norhydrocodone <25 Cutoff <25 ng/mL    Noroxycodone <25 Cutoff <25 ng/mL    Oxycodone <25 Cutoff <25 ng/mL    Oxymorphone <25 Cutoff <25 ng/mL    Tramadol <50 Cutoff <50 ng/mL    O-Desmethyltramadol <50 Cutoff <50 ng/mL    Fentanyl <2.5 Cutoff<2.5 ng/mL    Norfentanyl <2.5 Cutoff<2.5 ng/mL    METHADONE CONFIRMATION,URINE <25 Cutoff <25 ng/mL    EDDP <25 Cutoff <25 ng/mL     Results are as expected.          Controlled Substance Agreement:  Date of the Last Agreement: 5/4/2023  Reviewed Controlled Substance Agreement including but not limited to the benefits, risks, and alternatives to treatment with a Controlled Substance medication(s).    Benzodiazepines:  What is the patient's goal of therapy? To avoid panic attacks and remain calm  Is this being achieved with current treatment? Yes      LAMINE-7:  No data recorded    Activities of Daily Living:   Is your overall impression that this patient is benefiting (symptom reduction outweighs side effects) from benzodiazepine therapy? Yes     1. Physical Functioning: Same  2. Family Relationship: Better  3. Social Relationship: Better  4. Mood: Better  5. Sleep Patterns: Better  6. Overall Function: BetterSubjective   Patient ID: Obed Uribe is a 44 y.o. male who presents for CSA refill.    Patient is here today for follow-up on hypertension generalized anxiety disorder and medication management.  He does need a refill on his Ativan but other meds are good.  Annual blood work was completed in January.  He has a extra stress in his life right now because his wife's grandmother who is 99 is on hospice and they are spending a lot of extra time with her and helping with the family take care of her.        Review of Systems   Constitutional:  Negative for chills, fatigue and fever.   HENT:  Negative for sore throat.    Eyes:  Negative for visual disturbance.   Respiratory:  Negative for cough and shortness of breath.    Cardiovascular:  Negative for chest pain, palpitations and leg swelling.   Gastrointestinal:  Negative for constipation, diarrhea, nausea and vomiting.   Genitourinary:  Negative for difficulty urinating, dysuria, frequency, hematuria and urgency.   Musculoskeletal:  Negative for arthralgias and myalgias.   Skin:  Negative for rash.   Neurological:  Negative for dizziness, syncope, weakness, light-headedness and headaches.       Objective   Medication  Documentation Review Audit       Reviewed by Audrey Mancia MD (Physician) on 04/22/24 at 0855      Medication Order Taking? Sig Documenting Provider Last Dose Status   ALPRAZolam (Xanax) 0.5 mg tablet [id number]  Take 1 tablet (0.5 mg) by mouth 2 times a day. Audrey Mancia MD  Active   amLODIPine (Norvasc) 10 mg tablet [id number]  Take 1 tablet (10 mg) by mouth once daily. as directed Audrey Mancia MD  Active   azelastine (Astelin) 137 mcg (0.1 %) nasal spray [id number]  Administer into affected nostril(s). Historical Provider, MD  Active   cholecalciferol (Vitamin D-3) 125 MCG (5000 UT) capsule [id number]  Take 1 capsule (125 mcg) by mouth once daily. Historical Provider, MD  Active     Discontinued 04/17/24 1308   hydroCHLOROthiazide (Microzide) 12.5 mg tablet [id number]  Take 1 tablet (12.5 mg) by mouth once daily. Audrey Mancia MD  Active   lisinopril 20 mg tablet [id number]  Take 1 tablet (20 mg) by mouth once daily. Audrey Mancia MD  Active   metoprolol succinate XL (Toprol-XL) 100 mg 24 hr tablet [id number]  Take 1 tablet (100 mg) by mouth once daily. Audrey Mancia MD  Active   sertraline (Zoloft) 100 mg tablet [id number]  Take 1 tablet (100 mg) by mouth once daily. Audrey Mancia MD  Active   sildenafil (Viagra) 100 mg tablet [id number]  Take 1 tablet (100 mg) by mouth if needed for erectile dysfunction. Audrey Mancia MD  Active                  No Known Allergies  Physical Exam  Constitutional:       Appearance: Normal appearance.   HENT:      Head: Normocephalic and atraumatic.      Nose: Nose normal.   Eyes:      Extraocular Movements: Extraocular movements intact.      Pupils: Pupils are equal, round, and reactive to light.   Cardiovascular:      Rate and Rhythm: Normal rate and regular rhythm.   Pulmonary:      Breath sounds: Normal breath sounds.   Abdominal:      General: Abdomen is flat. Bowel sounds are normal.      Palpations: Abdomen is soft.   Musculoskeletal:      Right lower leg: No edema.     "  Left lower leg: No edema.   Neurological:      Mental Status: He is alert.     /71 (BP Location: Left arm, Patient Position: Sitting)   Pulse 75   Ht 1.753 m (5' 9\") Comment: with shoes on  Wt 126 kg (278 lb)   SpO2 98%   BMI 41.05 kg/m²       Assessment/Plan   Problem List Items Addressed This Visit       Anxiety disorder     Anxiety in general is stable however he does admit to extra stress because of the advancing age of his wife's grandmother was 99 and now on hospice.  The family spending extra time with her to help out which puts stress on the family and on his wife.  After reviewing the state database and answering questions patient was given a refill    Patient will need a new controlled substance agreement at his next refill in 90 days         Relevant Medications    ALPRAZolam (Xanax) 0.5 mg tablet    Hypertension - Primary     Blood pressure stable and well-controlled.         Panic attacks              It has been a pleasure seeing you.  Audrey Mancia MD   "

## 2024-05-01 ENCOUNTER — TELEPHONE (OUTPATIENT)
Dept: PRIMARY CARE | Facility: CLINIC | Age: 44
End: 2024-05-01
Payer: COMMERCIAL

## 2024-05-01 NOTE — TELEPHONE ENCOUNTER
Pt called in and stated his allergies have been very bad the last two weeks. Pt went home early from work today due to his eyes swelling shut and constantly watering. Pt received an allergy shot, Kenalog 10-12 years ago and would like to know if he could possibly get it again. Does pt need an appt to discuss this with AMD?  Pt # [phone]

## 2024-05-02 ENCOUNTER — OFFICE VISIT (OUTPATIENT)
Dept: PRIMARY CARE | Facility: CLINIC | Age: 44
End: 2024-05-02
Payer: COMMERCIAL

## 2024-05-02 VITALS
DIASTOLIC BLOOD PRESSURE: 75 MMHG | HEART RATE: 81 BPM | HEIGHT: 69 IN | OXYGEN SATURATION: 96 % | BODY MASS INDEX: 41.86 KG/M2 | WEIGHT: 282.6 LBS | SYSTOLIC BLOOD PRESSURE: 118 MMHG

## 2024-05-02 DIAGNOSIS — J30.1 SEASONAL ALLERGIC RHINITIS DUE TO POLLEN: ICD-10-CM

## 2024-05-02 DIAGNOSIS — J30.2 SEASONAL ALLERGIC RHINITIS, UNSPECIFIED TRIGGER: ICD-10-CM

## 2024-05-02 DIAGNOSIS — H10.13 ALLERGIC CONJUNCTIVITIS OF BOTH EYES: Primary | ICD-10-CM

## 2024-05-02 PROCEDURE — 99213 OFFICE O/P EST LOW 20 MIN: CPT | Performed by: INTERNAL MEDICINE

## 2024-05-02 PROCEDURE — 3078F DIAST BP <80 MM HG: CPT | Performed by: INTERNAL MEDICINE

## 2024-05-02 PROCEDURE — 3074F SYST BP LT 130 MM HG: CPT | Performed by: INTERNAL MEDICINE

## 2024-05-02 RX ORDER — OLOPATADINE HYDROCHLORIDE 1 MG/ML
1 SOLUTION/ DROPS OPHTHALMIC 2 TIMES DAILY PRN
Qty: 5 ML | Refills: 2 | Status: SHIPPED | OUTPATIENT
Start: 2024-05-02 | End: 2024-08-30

## 2024-05-02 RX ORDER — MONTELUKAST SODIUM 10 MG/1
10 TABLET ORAL NIGHTLY
Qty: 30 TABLET | Refills: 5 | Status: SHIPPED | OUTPATIENT
Start: 2024-05-02 | End: 2024-10-29

## 2024-05-02 ASSESSMENT — ENCOUNTER SYMPTOMS
DYSURIA: 0
HEADACHES: 0
CONSTIPATION: 0
FREQUENCY: 0
MYALGIAS: 0
WEAKNESS: 0
PALPITATIONS: 0
RHINORRHEA: 1
SINUS PRESSURE: 0
SHORTNESS OF BREATH: 0
SINUS PAIN: 0
COUGH: 0
DIFFICULTY URINATING: 0
SORE THROAT: 0
CHILLS: 0
LIGHT-HEADEDNESS: 0
HEMATURIA: 0
VOMITING: 0
FEVER: 0
NAUSEA: 0
ARTHRALGIAS: 0
FATIGUE: 0
DIARRHEA: 0
DIZZINESS: 0

## 2024-05-02 ASSESSMENT — PAIN SCALES - GENERAL: PAINLEVEL: 0-NO PAIN

## 2024-05-02 NOTE — ASSESSMENT & PLAN NOTE
Patient is having allergic symptoms which are worse this year.  Typically he gets nasal congestion and drainage but this year he also has burning itchy watery eyes and a lot of postnasal drip.  He denies a cough but says he can frequently clear his throat because of the drainage.  He has used Zyrtec but it makes him a little sleepy.  He admits he is only taking it intermittently and nothing regularly.    Patient has used azelastine nasal spray in the past and he did not know he did not need a prescription for it any longer.    Patient says symptoms are very similar to previous years just more severe  At this time I have asked him to use Allegra 180 mg daily, to this I will add Singulair 10 mg daily.  Patient will buy Astepro over-the-counter which is the same as the prescription azelastine and was instructed to use 2 puffs each nostril once a day at bedtime.  He was warned about side effects which included sedation.  Finally the patient will be given Pataday eyedrops for allergic conjunctivitis as well.  He is to use all of these measures for the next 2 weeks and if symptoms or not improving let us know.  If he has new symptoms such as fever chills or something different he is also to let us know as he could have a secondary infection and may need an antibiotic.

## 2024-05-02 NOTE — PATIENT INSTRUCTIONS
Use astepro OTC 2 puffs each nostril once  day at bedtime    Use allegra 180mg once a day for allergies

## 2024-05-02 NOTE — PROGRESS NOTES
Subjective   Patient ID: Obed Uribe is a 44 y.o. male who presents for allergies and general health management.    Patient is here for allergy issues.  He says he has seasonal allergies every year in April and May but this year seem to be much much worse.  He used to take azelastine nasal spray so he came in to see if he could get a prescription of that or if there was something better because this year his eyes are also itching and watering and burning which is new for him.          Review of Systems   Constitutional:  Negative for chills, fatigue and fever.   HENT:  Positive for congestion, postnasal drip, rhinorrhea and sneezing. Negative for ear discharge, ear pain, nosebleeds, sinus pressure, sinus pain and sore throat.    Eyes:  Negative for visual disturbance.   Respiratory:  Negative for cough and shortness of breath.    Cardiovascular:  Negative for chest pain, palpitations and leg swelling.   Gastrointestinal:  Negative for constipation, diarrhea, nausea and vomiting.   Genitourinary:  Negative for difficulty urinating, dysuria, frequency, hematuria and urgency.   Musculoskeletal:  Negative for arthralgias and myalgias.   Skin:  Negative for rash.   Neurological:  Negative for dizziness, syncope, weakness, light-headedness and headaches.       Objective   Medication Documentation Review Audit       Reviewed by Audrey Mancia MD (Physician) on 05/02/24 at 1152      Medication Order Taking? Sig Documenting Provider Last Dose Status   ALPRAZolam (Xanax) 0.5 mg tablet [id number]  Take 1 tablet (0.5 mg) by mouth 2 times a day. Audrey Mancia MD  Active   amLODIPine (Norvasc) 10 mg tablet [id number]  Take 1 tablet (10 mg) by mouth once daily. as directed Audrey Mancia MD  Active   azelastine (Astelin) 137 mcg (0.1 %) nasal spray [id number]  Administer into affected nostril(s). Historical Provider, MD  Active   cholecalciferol (Vitamin D-3) 125 MCG (5000 UT) capsule [id number]  Take 1 capsule (125 mcg) by  "mouth once daily. Historical Manish, MD  Active   hydroCHLOROthiazide (Microzide) 12.5 mg tablet [id number]  Take 1 tablet (12.5 mg) by mouth once daily. Audrey Mancia MD  Active   lisinopril 20 mg tablet [id number]  Take 1 tablet (20 mg) by mouth once daily. Audrey Mancia MD  Active   metoprolol succinate XL (Toprol-XL) 100 mg 24 hr tablet [id number]  Take 1 tablet (100 mg) by mouth once daily. Audrey Mancia MD  Active   sertraline (Zoloft) 100 mg tablet [id number]  Take 1 tablet (100 mg) by mouth once daily. Audrey Mancia MD  Active   sildenafil (Viagra) 100 mg tablet [id number]  Take 1 tablet (100 mg) by mouth if needed for erectile dysfunction. Audrey Mancia MD  Active                  No Known Allergies  Physical Exam  Constitutional:       Appearance: Normal appearance.   HENT:      Head: Normocephalic and atraumatic.      Nose: Congestion and rhinorrhea present.      Comments: Nasal turbinates are boggy and congested.  Eyes:      Extraocular Movements: Extraocular movements intact.      Pupils: Pupils are equal, round, and reactive to light.   Cardiovascular:      Rate and Rhythm: Normal rate and regular rhythm.   Pulmonary:      Breath sounds: Normal breath sounds.   Abdominal:      General: Abdomen is flat. Bowel sounds are normal.      Palpations: Abdomen is soft.   Musculoskeletal:      Right lower leg: No edema.      Left lower leg: No edema.   Neurological:      Mental Status: He is alert.     /75 (BP Location: Left arm, Patient Position: Sitting)   Pulse 81   Ht 1.753 m (5' 9\")   Wt 128 kg (282 lb 9.6 oz)   SpO2 96%   BMI 41.73 kg/m²       Assessment/Plan   Problem List Items Addressed This Visit       Allergic rhinitis    Allergic conjunctivitis of both eyes - Primary    Relevant Medications    olopatadine (Patanol) 0.1 % ophthalmic solution    Seasonal allergic rhinitis due to pollen     Patient is having allergic symptoms which are worse this year.  Typically he gets nasal congestion " and drainage but this year he also has burning itchy watery eyes and a lot of postnasal drip.  He denies a cough but says he can frequently clear his throat because of the drainage.  He has used Zyrtec but it makes him a little sleepy.  He admits he is only taking it intermittently and nothing regularly.    Patient has used azelastine nasal spray in the past and he did not know he did not need a prescription for it any longer.    Patient says symptoms are very similar to previous years just more severe  At this time I have asked him to use Allegra 180 mg daily, to this I will add Singulair 10 mg daily.  Patient will buy Astepro over-the-counter which is the same as the prescription azelastine and was instructed to use 2 puffs each nostril once a day at bedtime.  He was warned about side effects which included sedation.  Finally the patient will be given Pataday eyedrops for allergic conjunctivitis as well.  He is to use all of these measures for the next 2 weeks and if symptoms or not improving let us know.  If he has new symptoms such as fever chills or something different he is also to let us know as he could have a secondary infection and may need an antibiotic.         Relevant Medications    montelukast (Singulair) 10 mg tablet              It has been a pleasure seeing you.  Audrey Mancia MD

## 2024-07-15 ENCOUNTER — APPOINTMENT (OUTPATIENT)
Dept: PRIMARY CARE | Facility: CLINIC | Age: 44
End: 2024-07-15
Payer: COMMERCIAL

## 2024-07-22 ENCOUNTER — APPOINTMENT (OUTPATIENT)
Dept: PRIMARY CARE | Facility: CLINIC | Age: 44
End: 2024-07-22
Payer: COMMERCIAL

## 2024-07-22 VITALS
WEIGHT: 279.2 LBS | OXYGEN SATURATION: 97 % | HEART RATE: 77 BPM | BODY MASS INDEX: 41.35 KG/M2 | DIASTOLIC BLOOD PRESSURE: 76 MMHG | HEIGHT: 69 IN | SYSTOLIC BLOOD PRESSURE: 122 MMHG

## 2024-07-22 DIAGNOSIS — F41.1 GENERALIZED ANXIETY DISORDER: ICD-10-CM

## 2024-07-22 DIAGNOSIS — I10 PRIMARY HYPERTENSION: Primary | ICD-10-CM

## 2024-07-22 PROCEDURE — 99214 OFFICE O/P EST MOD 30 MIN: CPT | Performed by: INTERNAL MEDICINE

## 2024-07-22 PROCEDURE — 3078F DIAST BP <80 MM HG: CPT | Performed by: INTERNAL MEDICINE

## 2024-07-22 PROCEDURE — 3008F BODY MASS INDEX DOCD: CPT | Performed by: INTERNAL MEDICINE

## 2024-07-22 PROCEDURE — 3074F SYST BP LT 130 MM HG: CPT | Performed by: INTERNAL MEDICINE

## 2024-07-22 RX ORDER — ALPRAZOLAM 0.5 MG/1
0.5 TABLET ORAL 2 TIMES DAILY
Qty: 180 TABLET | Refills: 0 | Status: SHIPPED | OUTPATIENT
Start: 2024-07-22

## 2024-07-22 ASSESSMENT — ENCOUNTER SYMPTOMS
DIZZINESS: 0
CONSTIPATION: 0
NERVOUS/ANXIOUS: 1
COUGH: 0
LIGHT-HEADEDNESS: 0
DIARRHEA: 0
SHORTNESS OF BREATH: 0
ABDOMINAL PAIN: 0
TROUBLE SWALLOWING: 0
SORE THROAT: 0
FEVER: 0
PALPITATIONS: 0
FATIGUE: 0
FREQUENCY: 0
ARTHRALGIAS: 0
NAUSEA: 0
DYSURIA: 0
VOMITING: 0

## 2024-07-22 ASSESSMENT — PATIENT HEALTH QUESTIONNAIRE - PHQ9
1. LITTLE INTEREST OR PLEASURE IN DOING THINGS: NOT AT ALL
SUM OF ALL RESPONSES TO PHQ9 QUESTIONS 1 AND 2: 0
2. FEELING DOWN, DEPRESSED OR HOPELESS: NOT AT ALL

## 2024-07-22 ASSESSMENT — PAIN SCALES - GENERAL: PAINLEVEL: 0-NO PAIN

## 2024-07-22 NOTE — PROGRESS NOTES
OARRS:  Audrey Mancia MD on 7/22/2024 11:22 AM  I have personally reviewed the OARRS report for Obed Uribe. I have considered the risks of abuse, dependence, addiction and diversion    Is the patient prescribed a combination of a benzodiazepine and opioid?  No    Last Urine Drug Screen / ordered today: Yes  Recent Results (from the past 8760 hour(s))   OPIATE/OPIOID/BENZO PRESCRIPTION COMPLIANCE    Collection Time: 07/31/23  2:52 PM   Result Value Ref Range    DRUG SCREEN COMMENT URINE SEE BELOW     Creatine, Urine 97.2 mg/dL    Amphetamine Screen, Urine PRESUMPTIVE NEGATIVE NEGATIVE    Barbiturate Screen, Urine PRESUMPTIVE NEGATIVE NEGATIVE    Cannabinoid Screen, Urine PRESUMPTIVE NEGATIVE NEGATIVE    Cocaine Screen, Urine PRESUMPTIVE NEGATIVE NEGATIVE    PCP Screen, Urine PRESUMPTIVE NEGATIVE NEGATIVE    7-Aminoclonazepam <25 Cutoff <25 ng/mL    Alpha-Hydroxyalprazolam <25 Cutoff <25 ng/mL    Alpha-Hydroxymidazolam <25 Cutoff <25 ng/mL    Alprazolam <25 Cutoff <25 ng/mL    Chlordiazepoxide <25 Cutoff <25 ng/mL    Clonazepam <25 Cutoff <25 ng/mL    Diazepam <25 Cutoff <25 ng/mL    Lorazepam <25 Cutoff <25 ng/mL    Midazolam <25 Cutoff <25 ng/mL    Nordiazepam <25 Cutoff <25 ng/mL    Oxazepam <25 Cutoff <25 ng/mL    Temazepam <25 Cutoff <25 ng/mL    Zolpidem <25 Cutoff <25 ng/mL    Zolpidem Metabolite (ZCA) <25 Cutoff <25 ng/mL    6-Acetylmorphine <25 Cutoff <25 ng/mL    Codeine <50 Cutoff <50 ng/mL    Hydrocodone <25 Cutoff <25 ng/mL    Hydromorphone <25 Cutoff <25 ng/mL    Morphine Urine <50 Cutoff <50 ng/mL    Norhydrocodone <25 Cutoff <25 ng/mL    Noroxycodone <25 Cutoff <25 ng/mL    Oxycodone <25 Cutoff <25 ng/mL    Oxymorphone <25 Cutoff <25 ng/mL    Tramadol <50 Cutoff <50 ng/mL    O-Desmethyltramadol <50 Cutoff <50 ng/mL    Fentanyl <2.5 Cutoff<2.5 ng/mL    Norfentanyl <2.5 Cutoff<2.5 ng/mL    METHADONE CONFIRMATION,URINE <25 Cutoff <25 ng/mL    EDDP <25 Cutoff <25 ng/mL     Results are as expected.      Clinical rationale for not completing a Urine Drug Screen: will get at next appt      Controlled Substance Agreement:  Date of the Last Agreement: 7/22/24  Reviewed Controlled Substance Agreement including but not limited to the benefits, risks, and alternatives to treatment with a Controlled Substance medication(s).    Benzodiazepines:  What is the patient's goal of therapy? To remain calm  Is this being achieved with current treatment? yes    LAMINE-7:  No data recorded    Activities of Daily Living:   Is your overall impression that this patient is benefiting (symptom reduction outweighs side effects) from benzodiazepine therapy? Yes     1. Physical Functioning: Same  2. Family Relationship: Better  3. Social Relationship: Better  4. Mood: Better  5. Sleep Patterns: Better  6. Overall Function: BetterSubjective   Patient ID: Obed Uribe is a 44 y.o. male who presents for CSA refill and HTN management.    Patient is here for his anxiety disorder and a new controlled substance agreement.  The new agreement was reviewed and read to the patient in its entirety and he did sign off on it.  I will order urine test or screen at the next appointment when the patient is not suspecting it.          Review of Systems   Constitutional:  Negative for fatigue and fever.   HENT:  Negative for sore throat and trouble swallowing.    Eyes:  Negative for visual disturbance.   Respiratory:  Negative for cough and shortness of breath.    Cardiovascular:  Negative for chest pain, palpitations and leg swelling.   Gastrointestinal:  Negative for abdominal pain, constipation, diarrhea, nausea and vomiting.   Genitourinary:  Negative for dysuria and frequency.   Musculoskeletal:  Negative for arthralgias.   Skin:  Negative for rash.   Neurological:  Negative for dizziness and light-headedness.   Psychiatric/Behavioral:  The patient is nervous/anxious.        Objective   Medication Documentation Review Audit       Reviewed by Audrey BALLARD  "MD Gavino (Physician) on 07/22/24 at 1119      Medication Order Taking? Sig Documenting Provider Last Dose Status   ALPRAZolam (Xanax) 0.5 mg tablet [id number]  Take 1 tablet (0.5 mg) by mouth 2 times a day. Audrey Mancia MD  Active   amLODIPine (Norvasc) 10 mg tablet [id number]  Take 1 tablet (10 mg) by mouth once daily. as directed Audrey Mancia MD  Active   azelastine (Astelin) 137 mcg (0.1 %) nasal spray [id number]  Administer into affected nostril(s). Historical Provider, MD  Active   cholecalciferol (Vitamin D-3) 125 MCG (5000 UT) capsule [id number]  Take 1 capsule (125 mcg) by mouth once daily. Historical Provider, MD  Active   hydroCHLOROthiazide (Microzide) 12.5 mg tablet [id number]  Take 1 tablet (12.5 mg) by mouth once daily. Aurdey Mancia MD  Active   lisinopril 20 mg tablet [id number]  Take 1 tablet (20 mg) by mouth once daily. Audrey Mancia MD  Active   metoprolol succinate XL (Toprol-XL) 100 mg 24 hr tablet [id number]  Take 1 tablet (100 mg) by mouth once daily. Audrey Mancia MD  Active   montelukast (Singulair) 10 mg tablet [id number]  Take 1 tablet (10 mg) by mouth once daily at bedtime. Audrey Mancia MD  Active   olopatadine (Patanol) 0.1 % ophthalmic solution [id number]  Administer 1 drop into both eyes 2 times a day as needed for allergies. Audrey Mancia MD  Active   sertraline (Zoloft) 100 mg tablet [id number]  Take 1 tablet (100 mg) by mouth once daily. Audrey Mancia MD  Active   sildenafil (Viagra) 100 mg tablet [id number]  TAKE 1 TABLET BY MOUTH AS NEEDED FOR ERECTILE DYSFUNCTION Audrey Mancia MD  Active                  No Known Allergies    /76   Pulse 77   Ht 1.753 m (5' 9\")   Wt 127 kg (279 lb 3.2 oz)   SpO2 97%   BMI 41.23 kg/m²     Physical Exam  Constitutional:       Appearance: Normal appearance.   HENT:      Head: Normocephalic and atraumatic.      Nose: Nose normal.   Eyes:      Extraocular Movements: Extraocular movements intact.      Pupils: Pupils are equal, " round, and reactive to light.   Cardiovascular:      Rate and Rhythm: Normal rate and regular rhythm.   Pulmonary:      Breath sounds: Normal breath sounds.   Abdominal:      General: Abdomen is flat. Bowel sounds are normal.      Palpations: Abdomen is soft.   Musculoskeletal:      Right lower leg: No edema.      Left lower leg: No edema.   Neurological:      Mental Status: He is alert.           Assessment/Plan   Problem List Items Addressed This Visit       Anxiety disorder     New controlled substance agreement was signed today by the patient and after State database was reviewed, questions were answered and he was given a refill for the next 90 days         Relevant Medications    ALPRAZolam (Xanax) 0.5 mg tablet    Hypertension - Primary     Blood pressure was mildly elevated initially however he said he was in a rush to get here.  After sitting for few minutes repeat was normalized.                   It has been a pleasure seeing you.  Audrey Mancia MD

## 2024-07-22 NOTE — ASSESSMENT & PLAN NOTE
Blood pressure was mildly elevated initially however he said he was in a rush to get here.  After sitting for few minutes repeat was normalized.

## 2024-07-22 NOTE — ASSESSMENT & PLAN NOTE
New controlled substance agreement was signed today by the patient and after State database was reviewed, questions were answered and he was given a refill for the next 90 days

## 2024-08-14 DIAGNOSIS — I15.9 SECONDARY HYPERTENSION: ICD-10-CM

## 2024-08-14 RX ORDER — HYDROCHLOROTHIAZIDE 12.5 MG/1
12.5 TABLET ORAL DAILY
Qty: 90 TABLET | Refills: 0 | Status: SHIPPED | OUTPATIENT
Start: 2024-08-14

## 2024-08-14 NOTE — TELEPHONE ENCOUNTER
Please refill    hydroCHLOROthiazide (Microzide) 12.5 mg tablet   12.5 mg, Daily   [facility]

## 2024-10-14 ENCOUNTER — APPOINTMENT (OUTPATIENT)
Dept: PRIMARY CARE | Facility: CLINIC | Age: 44
End: 2024-10-14
Payer: COMMERCIAL

## 2024-10-14 ENCOUNTER — LAB (OUTPATIENT)
Dept: LAB | Facility: LAB | Age: 44
End: 2024-10-14
Payer: COMMERCIAL

## 2024-10-14 VITALS
SYSTOLIC BLOOD PRESSURE: 122 MMHG | WEIGHT: 277 LBS | BODY MASS INDEX: 41.03 KG/M2 | HEART RATE: 74 BPM | OXYGEN SATURATION: 98 % | HEIGHT: 69 IN | DIASTOLIC BLOOD PRESSURE: 78 MMHG

## 2024-10-14 DIAGNOSIS — F41.1 GENERALIZED ANXIETY DISORDER: ICD-10-CM

## 2024-10-14 DIAGNOSIS — F41.0 PANIC ATTACKS: ICD-10-CM

## 2024-10-14 DIAGNOSIS — Z79.899 MEDICATION MANAGEMENT: ICD-10-CM

## 2024-10-14 DIAGNOSIS — Z23 FLU VACCINE NEED: ICD-10-CM

## 2024-10-14 DIAGNOSIS — Z79.899 MEDICATION MANAGEMENT: Primary | ICD-10-CM

## 2024-10-14 DIAGNOSIS — Z00.00 ANNUAL PHYSICAL EXAM: ICD-10-CM

## 2024-10-14 DIAGNOSIS — I10 PRIMARY HYPERTENSION: ICD-10-CM

## 2024-10-14 LAB
AMPHETAMINES UR QL SCN: NORMAL
BARBITURATES UR QL SCN: NORMAL
BZE UR QL SCN: NORMAL
CANNABINOIDS UR QL SCN: NORMAL
CREAT UR-MCNC: 34.7 MG/DL (ref 20–370)
PCP UR QL SCN: NORMAL

## 2024-10-14 PROCEDURE — 99214 OFFICE O/P EST MOD 30 MIN: CPT | Performed by: INTERNAL MEDICINE

## 2024-10-14 PROCEDURE — 90471 IMMUNIZATION ADMIN: CPT | Performed by: INTERNAL MEDICINE

## 2024-10-14 PROCEDURE — 3074F SYST BP LT 130 MM HG: CPT | Performed by: INTERNAL MEDICINE

## 2024-10-14 PROCEDURE — 80307 DRUG TEST PRSMV CHEM ANLYZR: CPT

## 2024-10-14 PROCEDURE — 3078F DIAST BP <80 MM HG: CPT | Performed by: INTERNAL MEDICINE

## 2024-10-14 PROCEDURE — 3008F BODY MASS INDEX DOCD: CPT | Performed by: INTERNAL MEDICINE

## 2024-10-14 PROCEDURE — 90656 IIV3 VACC NO PRSV 0.5 ML IM: CPT | Performed by: INTERNAL MEDICINE

## 2024-10-14 PROCEDURE — 80361 OPIATES 1 OR MORE: CPT

## 2024-10-14 PROCEDURE — 80358 DRUG SCREENING METHADONE: CPT

## 2024-10-14 PROCEDURE — 82570 ASSAY OF URINE CREATININE: CPT

## 2024-10-14 PROCEDURE — 80368 SEDATIVE HYPNOTICS: CPT

## 2024-10-14 PROCEDURE — 80346 BENZODIAZEPINES1-12: CPT

## 2024-10-14 PROCEDURE — 80365 DRUG SCREENING OXYCODONE: CPT

## 2024-10-14 PROCEDURE — 80373 DRUG SCREENING TRAMADOL: CPT

## 2024-10-14 PROCEDURE — 80354 DRUG SCREENING FENTANYL: CPT

## 2024-10-14 RX ORDER — ALPRAZOLAM 0.5 MG/1
0.5 TABLET ORAL 2 TIMES DAILY
Qty: 180 TABLET | Refills: 0 | Status: SHIPPED | OUTPATIENT
Start: 2024-10-14

## 2024-10-14 ASSESSMENT — ENCOUNTER SYMPTOMS
SHORTNESS OF BREATH: 0
NAUSEA: 0
ABDOMINAL PAIN: 0
LIGHT-HEADEDNESS: 0
DYSURIA: 0
DIARRHEA: 0
CONSTIPATION: 0
PALPITATIONS: 0
SORE THROAT: 0
TROUBLE SWALLOWING: 0
NERVOUS/ANXIOUS: 1
FEVER: 0
FATIGUE: 0
DIZZINESS: 0
DECREASED CONCENTRATION: 1
VOMITING: 0
COUGH: 0
ARTHRALGIAS: 0
FREQUENCY: 0

## 2024-10-14 ASSESSMENT — PATIENT HEALTH QUESTIONNAIRE - PHQ9
2. FEELING DOWN, DEPRESSED OR HOPELESS: NOT AT ALL
SUM OF ALL RESPONSES TO PHQ9 QUESTIONS 1 AND 2: 0
1. LITTLE INTEREST OR PLEASURE IN DOING THINGS: NOT AT ALL
SUM OF ALL RESPONSES TO PHQ9 QUESTIONS 1 AND 2: 0
2. FEELING DOWN, DEPRESSED OR HOPELESS: NOT AT ALL
1. LITTLE INTEREST OR PLEASURE IN DOING THINGS: NOT AT ALL

## 2024-10-14 ASSESSMENT — PAIN SCALES - GENERAL: PAINLEVEL: 0-NO PAIN

## 2024-10-14 NOTE — PATIENT INSTRUCTIONS
Follow up Dr Mancia in the week or 7 days before 1/23/25 and for HTN  30 min appointment        Get urine test today

## 2024-10-14 NOTE — ASSESSMENT & PLAN NOTE
Patient has anxiety disorder with panic attacks but it is well-controlled with his current dose of alprazolam.  After reviewing the state database he was given a refill.  He is due for urinalysis which she will get completed today.

## 2024-10-14 NOTE — ASSESSMENT & PLAN NOTE
Initial blood pressure was elevated but recheck after we spoke for a while had improved so no changes will be made

## 2024-10-14 NOTE — PROGRESS NOTES
OARRS:  Audrey Mancia MD on 10/14/2024 11:33 AM  I have personally reviewed the OARRS report for Obed Uribe. I have considered the risks of abuse, dependence, addiction and diversion    Is the patient prescribed a combination of a benzodiazepine and opioid?  No    Last Urine Drug Screen / ordered today: Yes  No results found for this or any previous visit (from the past 8760 hour(s)).  Yes      Clinical rationale for not completing a Urine Drug Screen: ordered      Controlled Substance Agreement:  Date of the Last Agreement: 7/22/24  Reviewed Controlled Substance Agreement including but not limited to the benefits, risks, and alternatives to treatment with a Controlled Substance medication(s).    Benzodiazepines:  What is the patient's goal of therapy? To remain calm  Is this being achieved with current treatment? Yes      LAMINE-7:  No data recorded    Activities of Daily Living:   Is your overall impression that this patient is benefiting (symptom reduction outweighs side effects) from benzodiazepine therapy? Yes     1. Physical Functioning: Same  2. Family Relationship: Better  3. Social Relationship: Better  4. Mood: Better  5. Sleep Patterns: Better  6. Overall Function: BetterSubjective   Patient ID: Obed Uribe is a 44 y.o. male who presents for No chief complaint on file..  Patient is here today for his anxiety disorder and a refill on his alprazolam but he has some extra questions about attention deficit disorder.  He says he is constantly going back for 1 thing after another because he cannot seem to remember to do 2 things at once.  He states he has been like that his whole life but recently it has been bothering his wife as he cannot seem to remember the simplest of commands and has to do 1 thing at a time.  I have explained that we could send him to psychiatry get a formal diagnosis of attention deficit disorder and there were some medication such as Strattera he could use however he was not a  candidate for any of the stimulants because of his hypertension.  Patient said he is more frustrated and would like to know how to get over the issues that are bothering him rather than taking more medications.    I did encourage the patient to start more repetitive behaviors.  Simple things like putting his keys in the same spot every day as soon as he walks in the house so that he could find them in the morning would be a simple solution.  I encouraged him and his wife to get a family calendar in the kitchen or office and that he must look at it twice a day to remember family events or plans that are written down on the calendar.  Encouraged him to take a picture of it with his phone intermittently as well so they could refer to it in case he forgotten.  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  Patient will work on some of the simple behavior modifications to see if it helps        Review of Systems   Constitutional:  Negative for fatigue and fever.   HENT:  Negative for sore throat and trouble swallowing.    Eyes:  Negative for visual disturbance.   Respiratory:  Negative for cough and shortness of breath.    Cardiovascular:  Negative for chest pain, palpitations and leg swelling.   Gastrointestinal:  Negative for abdominal pain, constipation, diarrhea, nausea and vomiting.   Genitourinary:  Negative for dysuria and frequency.   Musculoskeletal:  Negative for arthralgias.   Skin:  Negative for rash.   Neurological:  Negative for dizziness and light-headedness.   Psychiatric/Behavioral:  Positive for decreased concentration. The patient is nervous/anxious.        Objective   Medication Documentation Review Audit       Reviewed by Audrey Mancia MD (Physician) on 10/14/24 at 1132      Medication Order Taking? Sig Documenting Provider Last Dose  "Status   ALPRAZolam (Xanax) 0.5 mg tablet [id number]  Take 1 tablet (0.5 mg) by mouth 2 times a day. Audrey Mancai MD  Active   amLODIPine (Norvasc) 10 mg tablet [id number]  Take 1 tablet (10 mg) by mouth once daily. as directed Audrey Mancia MD  Active   azelastine (Astelin) 137 mcg (0.1 %) nasal spray [id number]  Administer into affected nostril(s). Historical Provider, MD  Active   cholecalciferol (Vitamin D-3) 125 MCG (5000 UT) capsule [id number]  Take 1 capsule (125 mcg) by mouth once daily. Historical Provider, MD  Active   hydroCHLOROthiazide (Microzide) 12.5 mg tablet [id number]  Take 1 tablet (12.5 mg) by mouth once daily. Audrey Mancia MD  Active   lisinopril 20 mg tablet [id number]  Take 1 tablet by mouth once daily Audrey Mancia MD  Active   metoprolol succinate XL (Toprol-XL) 100 mg 24 hr tablet [id number]  Take 1 tablet (100 mg) by mouth once daily. Audrey Mancia MD  Active   montelukast (Singulair) 10 mg tablet [id number]  Take 1 tablet (10 mg) by mouth once daily at bedtime. Audrey Mancia MD  Active   olopatadine (Patanol) 0.1 % ophthalmic solution [id number]  Administer 1 drop into both eyes 2 times a day as needed for allergies. Audrey Mancia MD   24 [id number]   sertraline (Zoloft) 100 mg tablet [id number]  Take 1 tablet (100 mg) by mouth once daily. Audrey Mancia MD  Active   sildenafil (Viagra) 100 mg tablet [id number]  TAKE 1 TABLET BY MOUTH AS NEEDED FOR ERECTILE DYSFUNCTION Audrey Mancia MD  Active                  No Known Allergies    /78   Pulse 74   Ht 1.753 m (5' 9\")   Wt 126 kg (277 lb)   SpO2 98%   BMI 40.91 kg/m²     Physical Exam  Constitutional:       Appearance: Normal appearance. He is obese.   HENT:      Head: Normocephalic and atraumatic.      Nose: Nose normal.   Eyes:      Extraocular Movements: Extraocular movements intact.      Pupils: Pupils are equal, round, and reactive to light.   Cardiovascular:      Rate and Rhythm: Normal rate and regular " rhythm.   Pulmonary:      Breath sounds: Normal breath sounds.   Abdominal:      General: Abdomen is flat. Bowel sounds are normal.      Palpations: Abdomen is soft.   Musculoskeletal:      Right lower leg: No edema.      Left lower leg: No edema.   Neurological:      Mental Status: He is alert.           Assessment/Plan   Problem List Items Addressed This Visit       Anxiety disorder     Patient has anxiety disorder with panic attacks but it is well-controlled with his current dose of alprazolam.  After reviewing the state database he was given a refill.  He is due for urinalysis which she will get completed today.           Relevant Medications    ALPRAZolam (Xanax) 0.5 mg tablet    Other Relevant Orders    Opiate/Opioid/Benzo Prescription Compliance    Hypertension     Initial blood pressure was elevated but recheck after we spoke for a while had improved so no changes will be made         Panic attacks     Other Visit Diagnoses       Medication management    -  Primary    Relevant Orders    Opiate/Opioid/Benzo Prescription Compliance    Flu vaccine need        Relevant Orders    Flu vaccine, trivalent, preservative free, age 6 months and greater (Fluarix/Fluzone/Flulaval)    Annual physical exam        Relevant Orders    Lipid Panel    CBC    Comprehensive Metabolic Panel    TSH with reflex to Free T4 if abnormal    Vitamin D 25-Hydroxy,Total (for eval of Vitamin D levels)          Patient will follow-up with me in 90 days for his next controlled substance refill as well as hypertension management etc.  Will check a urine drug screen today.  Patient will get fasting blood work before his next appointment.         It has been a pleasure seeing you.  Audrey Mancia MD

## 2024-11-09 DIAGNOSIS — I15.9 SECONDARY HYPERTENSION: ICD-10-CM

## 2024-11-11 RX ORDER — HYDROCHLOROTHIAZIDE 12.5 MG/1
12.5 TABLET ORAL DAILY
Qty: 90 TABLET | Refills: 3 | Status: SHIPPED | OUTPATIENT
Start: 2024-11-11

## 2024-11-11 NOTE — TELEPHONE ENCOUNTER
Refill    hydroCHLOROthiazide (Microzide) 12.5 mg tablet  12.5 mg, Daily           Summary: Take 1 tablet (12.5 mg) by mouth   once daily.,        [facility]

## 2024-12-05 ENCOUNTER — TELEPHONE (OUTPATIENT)
Dept: PRIMARY CARE | Facility: CLINIC | Age: 44
End: 2024-12-05

## 2024-12-05 ENCOUNTER — OFFICE VISIT (OUTPATIENT)
Dept: PRIMARY CARE | Facility: CLINIC | Age: 44
End: 2024-12-05
Payer: COMMERCIAL

## 2024-12-05 ENCOUNTER — LAB (OUTPATIENT)
Dept: LAB | Facility: LAB | Age: 44
End: 2024-12-05
Payer: COMMERCIAL

## 2024-12-05 VITALS
HEART RATE: 101 BPM | OXYGEN SATURATION: 98 % | HEIGHT: 69 IN | DIASTOLIC BLOOD PRESSURE: 75 MMHG | SYSTOLIC BLOOD PRESSURE: 133 MMHG | WEIGHT: 274.2 LBS | BODY MASS INDEX: 40.61 KG/M2

## 2024-12-05 DIAGNOSIS — M79.10 MYALGIA: ICD-10-CM

## 2024-12-05 DIAGNOSIS — R23.3 PETECHIAE: ICD-10-CM

## 2024-12-05 DIAGNOSIS — R23.3 PETECHIAE: Primary | ICD-10-CM

## 2024-12-05 LAB
ALBUMIN SERPL BCP-MCNC: 3.9 G/DL (ref 3.4–5)
ALP SERPL-CCNC: 105 U/L (ref 33–120)
ALT SERPL W P-5'-P-CCNC: 44 U/L (ref 10–52)
ANION GAP SERPL CALC-SCNC: 14 MMOL/L (ref 10–20)
APTT PPP: 25 SECONDS (ref 27–38)
AST SERPL W P-5'-P-CCNC: 30 U/L (ref 9–39)
BASOPHILS # BLD AUTO: 0.06 X10*3/UL (ref 0–0.1)
BASOPHILS NFR BLD AUTO: 0.6 %
BILIRUB SERPL-MCNC: 0.9 MG/DL (ref 0–1.2)
BUN SERPL-MCNC: 43 MG/DL (ref 6–23)
CALCIUM SERPL-MCNC: 9.1 MG/DL (ref 8.6–10.6)
CARDIAC TROPONIN I PNL SERPL HS: <3 NG/L (ref 0–53)
CHLORIDE SERPL-SCNC: 97 MMOL/L (ref 98–107)
CK SERPL-CCNC: 269 U/L (ref 0–325)
CO2 SERPL-SCNC: 29 MMOL/L (ref 21–32)
CREAT SERPL-MCNC: 1.54 MG/DL (ref 0.5–1.3)
EGFRCR SERPLBLD CKD-EPI 2021: 57 ML/MIN/1.73M*2
EOSINOPHIL # BLD AUTO: 0.16 X10*3/UL (ref 0–0.7)
EOSINOPHIL NFR BLD AUTO: 1.5 %
ERYTHROCYTE [DISTWIDTH] IN BLOOD BY AUTOMATED COUNT: 12.4 % (ref 11.5–14.5)
GLUCOSE SERPL-MCNC: 159 MG/DL (ref 74–99)
HCT VFR BLD AUTO: 39.3 % (ref 41–52)
HGB BLD-MCNC: 13.3 G/DL (ref 13.5–17.5)
IMM GRANULOCYTES # BLD AUTO: 0.04 X10*3/UL (ref 0–0.7)
IMM GRANULOCYTES NFR BLD AUTO: 0.4 % (ref 0–0.9)
INR PPP: 1 (ref 0.9–1.1)
LYMPHOCYTES # BLD AUTO: 1.39 X10*3/UL (ref 1.2–4.8)
LYMPHOCYTES NFR BLD AUTO: 12.9 %
MCH RBC QN AUTO: 30 PG (ref 26–34)
MCHC RBC AUTO-ENTMCNC: 33.8 G/DL (ref 32–36)
MCV RBC AUTO: 89 FL (ref 80–100)
MONOCYTES # BLD AUTO: 1.02 X10*3/UL (ref 0.1–1)
MONOCYTES NFR BLD AUTO: 9.5 %
NEUTROPHILS # BLD AUTO: 8.11 X10*3/UL (ref 1.2–7.7)
NEUTROPHILS NFR BLD AUTO: 75.1 %
NRBC BLD-RTO: 0 /100 WBCS (ref 0–0)
PLATELET # BLD AUTO: 279 X10*3/UL (ref 150–450)
POTASSIUM SERPL-SCNC: 4 MMOL/L (ref 3.5–5.3)
PROT SERPL-MCNC: 7.2 G/DL (ref 6.4–8.2)
PROTHROMBIN TIME: 11.7 SECONDS (ref 9.8–12.8)
RBC # BLD AUTO: 4.43 X10*6/UL (ref 4.5–5.9)
SODIUM SERPL-SCNC: 136 MMOL/L (ref 136–145)
WBC # BLD AUTO: 10.8 X10*3/UL (ref 4.4–11.3)

## 2024-12-05 PROCEDURE — 3008F BODY MASS INDEX DOCD: CPT | Performed by: INTERNAL MEDICINE

## 2024-12-05 PROCEDURE — 85610 PROTHROMBIN TIME: CPT

## 2024-12-05 PROCEDURE — 82550 ASSAY OF CK (CPK): CPT

## 2024-12-05 PROCEDURE — 85730 THROMBOPLASTIN TIME PARTIAL: CPT

## 2024-12-05 PROCEDURE — 3078F DIAST BP <80 MM HG: CPT | Performed by: INTERNAL MEDICINE

## 2024-12-05 PROCEDURE — 3075F SYST BP GE 130 - 139MM HG: CPT | Performed by: INTERNAL MEDICINE

## 2024-12-05 PROCEDURE — 84484 ASSAY OF TROPONIN QUANT: CPT

## 2024-12-05 PROCEDURE — 36415 COLL VENOUS BLD VENIPUNCTURE: CPT

## 2024-12-05 PROCEDURE — 85025 COMPLETE CBC W/AUTO DIFF WBC: CPT

## 2024-12-05 PROCEDURE — 80053 COMPREHEN METABOLIC PANEL: CPT

## 2024-12-05 PROCEDURE — 99213 OFFICE O/P EST LOW 20 MIN: CPT | Performed by: INTERNAL MEDICINE

## 2024-12-05 ASSESSMENT — ENCOUNTER SYMPTOMS
FEVER: 0
DIZZINESS: 0
SORE THROAT: 0
FATIGUE: 0
ABDOMINAL PAIN: 0
CONSTIPATION: 0
ARTHRALGIAS: 0
COUGH: 0
TROUBLE SWALLOWING: 0
SHORTNESS OF BREATH: 0
VOMITING: 0
LIGHT-HEADEDNESS: 0
NAUSEA: 0
FREQUENCY: 0
DIARRHEA: 0
PALPITATIONS: 0
DYSURIA: 0

## 2024-12-05 ASSESSMENT — PAIN SCALES - GENERAL: PAINLEVEL_OUTOF10: 0-NO PAIN

## 2024-12-05 NOTE — TELEPHONE ENCOUNTER
Patient called requesting an appointment today. He states that he ate A LOT of food for Thanksgiving, had a stomach ache and was very bloated. That lasted 2-3 days. Now he has red spots on his thighs and thigh pain. He was sitting in an odd position in a anibal chair for atleast 8-9 hours straight.  Patient states that he is very overwhelmed.    Obed Uribe  [phone]

## 2024-12-05 NOTE — ASSESSMENT & PLAN NOTE
Patient has unexplained petechia from the waist down.  I suspect this was sitting in the game chair too long and recommended to get rid of the chair.  He cut his elbow today and had no problems getting the bleeding stopping and is denies any excessive bruising.  There are abnormal there are no abnormalities in his buccal mucosa and there are no petechia from the waist up.  At this time I am going to check bleeding test including PT, PTT, CBC, CMP troponin and CPK since he also has a muscle aches and pains.  Patient will be notified tomorrow of results but I suspect this was mainly self inflicted from sitting in such a strange position for so many hours 2 days in a row.

## 2024-12-05 NOTE — PROGRESS NOTES
Subjective   Patient ID: Obed Uribe is a 44 y.o. male who presents for bilateral thigh pain with red spots.    Patient is a work in today for red spots that have appeared on his lower extremities.  He relates everything back to Thanksgiving day when he over ate.  He was extremely constipated and sat around and did nothing all of Thursday night and then Friday they had another huge meal and he still did not have a bowel movement.  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  On Sunday he continued to play video games and on Monday he noticed the red spots when he went to take a bath.  He looked them up and decided that they were petechiae since they were red nonblanching spots all over his lower extremities.  He admits to sitting in a strange position in his anibal chair for multiple hours on and on Saturday and Sunday.  Now he is convinced that he may have leukemia and is fearful because the red spots will not go away    He has not gotten any new red spots to his knowledge since Sunday with the ones he has have not faded either  Patient is now having bowel movements on a regular basis and is no longer bloated or constipated        Review of Systems   Constitutional:  Negative for fatigue and fever.   HENT:  Negative for sore throat and trouble swallowing.    Eyes:  Negative for visual disturbance.   Respiratory:  Negative for cough and shortness of breath.    Cardiovascular:  Negative for chest pain, palpitations and leg swelling.   Gastrointestinal:  Negative for abdominal pain, constipation, diarrhea, nausea and vomiting.   Genitourinary:  Negative for dysuria and frequency.   Musculoskeletal:  Negative for arthralgias.        Patient was having significant muscle aches upper and lower extremities but mainly lower  extremities for the last 4 to 5 days.  He also complains of red spots all over his lower extremities ranging from 1 to 3 mm in size.  The red spots are not blanching they are not raised and they are not painful   Skin:  Negative for rash.   Neurological:  Negative for dizziness and light-headedness.       Objective   Medication Documentation Review Audit       Reviewed by Audrey Mancia MD (Physician) on 24 at 1513      Medication Order Taking? Sig Documenting Provider Last Dose Status   ALPRAZolam (Xanax) 0.5 mg tablet [id number]  Take 1 tablet (0.5 mg) by mouth 2 times a day. Audrey Mancia MD  Active   amLODIPine (Norvasc) 10 mg tablet [id number]  TAKE 1 TABLET BY MOUTH ONCE DAILY AS DIRECTED Jo Dozier MD  Active   azelastine (Astelin) 137 mcg (0.1 %) nasal spray [id number]  Administer into affected nostril(s). Historical Provider, MD  Active   cholecalciferol (Vitamin D-3) 125 MCG (5000 UT) capsule [id number]  Take 1 capsule (125 mcg) by mouth once daily. Historical Provider, MD  Active   hydroCHLOROthiazide (Microzide) 12.5 mg tablet [id number]  TAKE 1 TABLET BY MOUTH ONCE DAILY. Audrey Mancia MD  Active   lisinopril 20 mg tablet [id number]  Take 1 tablet by mouth once daily Audrey Mancia MD  Active   metoprolol succinate XL (Toprol-XL) 100 mg 24 hr tablet [id number]  Take 1 tablet (100 mg) by mouth once daily. Audrey Mancia MD  Active   montelukast (Singulair) 10 mg tablet [id number]  Take 1 tablet (10 mg) by mouth once daily at bedtime. Audrey Mancia MD   10/29/24 2359   olopatadine (Patanol) 0.1 % ophthalmic solution [id number]  Administer 1 drop into both eyes 2 times a day as needed for allergies. Audrey Mancia MD   24 2359   sertraline (Zoloft) 100 mg tablet [id number]  Take 1 tablet (100 mg) by mouth once daily. Audrey Mancia MD  Active   sildenafil (Viagra) 100 mg tablet [id number]  TAKE 1 TABLET BY MOUTH AS NEEDED FOR ERECTILE DYSFUNCTION Audrey Mancia MD  Active  "                 No Known Allergies    /75   Pulse 101   Ht 1.753 m (5' 9\")   Wt 124 kg (274 lb 3.2 oz)   SpO2 98%   BMI 40.49 kg/m²     Physical Exam  Constitutional:       Appearance: Normal appearance.   HENT:      Head: Normocephalic and atraumatic.      Nose: Nose normal.   Eyes:      Extraocular Movements: Extraocular movements intact.      Pupils: Pupils are equal, round, and reactive to light.   Cardiovascular:      Rate and Rhythm: Normal rate and regular rhythm.   Pulmonary:      Breath sounds: Normal breath sounds.   Abdominal:      General: Abdomen is flat. Bowel sounds are normal.      Palpations: Abdomen is soft.   Musculoskeletal:      Right lower leg: No edema.      Left lower leg: No edema.   Skin:     Comments: Patient has small red spots scattered probably thousands of them from the waist down on his torso buttocks and lower extremities.  Red spots are not elevated not painful and are nonblanching and are consistent with petechia.  He has no splinter hemorrhages or abnormalities through the nailbeds in his hands.     Neurological:      Mental Status: He is alert.           Assessment/Plan   Problem List Items Addressed This Visit       Petechiae - Primary     Patient has unexplained petechia from the waist down.  I suspect this was sitting in the game chair too long and recommended to get rid of the chair.  He cut his elbow today and had no problems getting the bleeding stopping and is denies any excessive bruising.  There are abnormal there are no abnormalities in his buccal mucosa and there are no petechia from the waist up.  At this time I am going to check bleeding test including PT, PTT, CBC, CMP troponin and CPK since he also has a muscle aches and pains.  Patient will be notified tomorrow of results but I suspect this was mainly self inflicted from sitting in such a strange position for so many hours 2 days in a row.         Relevant Orders    Protime-INR    APTT    CBC and Auto " Differential    CK    Troponin I, High Sensitivity    Comprehensive metabolic panel    Myalgia    Relevant Orders    Protime-INR    APTT    CBC and Auto Differential    CK    Troponin I, High Sensitivity    Comprehensive metabolic panel              It has been a pleasure seeing you.  Audrey Mancia MD

## 2024-12-06 DIAGNOSIS — R23.3 PETECHIAE: Primary | ICD-10-CM

## 2024-12-06 DIAGNOSIS — N28.9 RENAL INSUFFICIENCY: ICD-10-CM

## 2024-12-09 ENCOUNTER — OFFICE VISIT (OUTPATIENT)
Dept: PRIMARY CARE | Facility: CLINIC | Age: 44
End: 2024-12-09
Payer: COMMERCIAL

## 2024-12-09 ENCOUNTER — LAB (OUTPATIENT)
Dept: LAB | Facility: LAB | Age: 44
End: 2024-12-09
Payer: COMMERCIAL

## 2024-12-09 VITALS
WEIGHT: 271.4 LBS | HEART RATE: 100 BPM | DIASTOLIC BLOOD PRESSURE: 76 MMHG | SYSTOLIC BLOOD PRESSURE: 113 MMHG | OXYGEN SATURATION: 97 % | HEIGHT: 69 IN | BODY MASS INDEX: 40.2 KG/M2

## 2024-12-09 DIAGNOSIS — M79.10 MYALGIA: ICD-10-CM

## 2024-12-09 DIAGNOSIS — R23.3 PETECHIAE: ICD-10-CM

## 2024-12-09 DIAGNOSIS — R21 RASH: ICD-10-CM

## 2024-12-09 DIAGNOSIS — R23.3 PETECHIAE: Primary | ICD-10-CM

## 2024-12-09 LAB
ALBUMIN SERPL BCP-MCNC: 3.4 G/DL (ref 3.4–5)
ALP SERPL-CCNC: 127 U/L (ref 33–120)
ALT SERPL W P-5'-P-CCNC: 62 U/L (ref 10–52)
ANION GAP SERPL CALC-SCNC: 15 MMOL/L (ref 10–20)
ASO AB SERPL-ACNC: 75 IU/ML (ref 0–250)
AST SERPL W P-5'-P-CCNC: 37 U/L (ref 9–39)
BASOPHILS # BLD AUTO: 0.05 X10*3/UL (ref 0–0.1)
BASOPHILS NFR BLD AUTO: 0.4 %
BILIRUB SERPL-MCNC: 0.6 MG/DL (ref 0–1.2)
BUN SERPL-MCNC: 29 MG/DL (ref 6–23)
CALCIUM SERPL-MCNC: 8.7 MG/DL (ref 8.6–10.6)
CHLORIDE SERPL-SCNC: 96 MMOL/L (ref 98–107)
CK SERPL-CCNC: 58 U/L (ref 0–325)
CO2 SERPL-SCNC: 27 MMOL/L (ref 21–32)
CREAT SERPL-MCNC: 1.54 MG/DL (ref 0.5–1.3)
CRP SERPL-MCNC: 20.86 MG/DL
EGFRCR SERPLBLD CKD-EPI 2021: 57 ML/MIN/1.73M*2
EOSINOPHIL # BLD AUTO: 0.11 X10*3/UL (ref 0–0.7)
EOSINOPHIL NFR BLD AUTO: 0.9 %
ERYTHROCYTE [DISTWIDTH] IN BLOOD BY AUTOMATED COUNT: 12.5 % (ref 11.5–14.5)
ERYTHROCYTE [SEDIMENTATION RATE] IN BLOOD BY WESTERGREN METHOD: 39 MM/H (ref 0–15)
GLUCOSE SERPL-MCNC: 164 MG/DL (ref 74–99)
HCT VFR BLD AUTO: 37.8 % (ref 41–52)
HGB BLD-MCNC: 12.3 G/DL (ref 13.5–17.5)
IMM GRANULOCYTES # BLD AUTO: 0.12 X10*3/UL (ref 0–0.7)
IMM GRANULOCYTES NFR BLD AUTO: 1 % (ref 0–0.9)
LYMPHOCYTES # BLD AUTO: 0.86 X10*3/UL (ref 1.2–4.8)
LYMPHOCYTES NFR BLD AUTO: 7.2 %
MCH RBC QN AUTO: 29.5 PG (ref 26–34)
MCHC RBC AUTO-ENTMCNC: 32.5 G/DL (ref 32–36)
MCV RBC AUTO: 91 FL (ref 80–100)
MONOCYTES # BLD AUTO: 1.14 X10*3/UL (ref 0.1–1)
MONOCYTES NFR BLD AUTO: 9.6 %
NEUTROPHILS # BLD AUTO: 9.62 X10*3/UL (ref 1.2–7.7)
NEUTROPHILS NFR BLD AUTO: 80.9 %
NRBC BLD-RTO: 0 /100 WBCS (ref 0–0)
PLATELET # BLD AUTO: 392 X10*3/UL (ref 150–450)
POC RAPID STREP: NEGATIVE
POTASSIUM SERPL-SCNC: 4.2 MMOL/L (ref 3.5–5.3)
PROT SERPL-MCNC: 6.5 G/DL (ref 6.4–8.2)
RBC # BLD AUTO: 4.17 X10*6/UL (ref 4.5–5.9)
SODIUM SERPL-SCNC: 134 MMOL/L (ref 136–145)
WBC # BLD AUTO: 11.9 X10*3/UL (ref 4.4–11.3)

## 2024-12-09 PROCEDURE — 80053 COMPREHEN METABOLIC PANEL: CPT

## 2024-12-09 PROCEDURE — 82550 ASSAY OF CK (CPK): CPT

## 2024-12-09 PROCEDURE — 85025 COMPLETE CBC W/AUTO DIFF WBC: CPT

## 2024-12-09 PROCEDURE — 3008F BODY MASS INDEX DOCD: CPT | Performed by: INTERNAL MEDICINE

## 2024-12-09 PROCEDURE — 99213 OFFICE O/P EST LOW 20 MIN: CPT | Performed by: INTERNAL MEDICINE

## 2024-12-09 PROCEDURE — 86038 ANTINUCLEAR ANTIBODIES: CPT

## 2024-12-09 PROCEDURE — 86060 ANTISTREPTOLYSIN O TITER: CPT

## 2024-12-09 PROCEDURE — 86140 C-REACTIVE PROTEIN: CPT

## 2024-12-09 PROCEDURE — 36415 COLL VENOUS BLD VENIPUNCTURE: CPT

## 2024-12-09 PROCEDURE — 85652 RBC SED RATE AUTOMATED: CPT

## 2024-12-09 PROCEDURE — 3074F SYST BP LT 130 MM HG: CPT | Performed by: INTERNAL MEDICINE

## 2024-12-09 PROCEDURE — 3078F DIAST BP <80 MM HG: CPT | Performed by: INTERNAL MEDICINE

## 2024-12-09 PROCEDURE — 86747 PARVOVIRUS ANTIBODY: CPT

## 2024-12-09 PROCEDURE — 87880 STREP A ASSAY W/OPTIC: CPT | Performed by: INTERNAL MEDICINE

## 2024-12-09 ASSESSMENT — ENCOUNTER SYMPTOMS
PALPITATIONS: 0
CONSTIPATION: 0
FEVER: 0
DIARRHEA: 0
FATIGUE: 1
NAUSEA: 0
SORE THROAT: 0
TROUBLE SWALLOWING: 0
ARTHRALGIAS: 0
COUGH: 0
ABDOMINAL PAIN: 0
MYALGIAS: 1
SHORTNESS OF BREATH: 0
DIZZINESS: 0
FREQUENCY: 0
DYSURIA: 0
VOMITING: 0
LIGHT-HEADEDNESS: 0

## 2024-12-09 ASSESSMENT — PAIN SCALES - GENERAL: PAINLEVEL_OUTOF10: 4

## 2024-12-09 NOTE — ASSESSMENT & PLAN NOTE
Patient has a worsening petechial ranch as well as slightly worsening renal function last week.  At this time because of the muscle aches and going to check a CPK as well as ASO titers CRP sed rate JACK parvo B19 IgM and IgG CMP and CBC.    I would like to see if we can get him in with a dermatologist for possible biopsy today    Patient was told to hold his hydrochlorothiazide Until we know what his renal function is doing.    Rapid strep in the office was negative today.  Etiology of his petechial rash is still not clear at this time.

## 2024-12-09 NOTE — PATIENT INSTRUCTIONS
Get stat labs this AM    See derm today or tomorrow for bx    Stop your hydrochlorothiazide

## 2024-12-09 NOTE — PROGRESS NOTES
Subjective   Patient ID: Obed Uribe is a 44 y.o. male who presents for a rash that is spreading.    Patient had a petechial rash last week which appeared to be fading was only from the waist down but now has exploded and is in his entire body.  It is not very pruritic but he noticed it sometime on Sunday that it was slowly growing on all of his upper extremities torso etc.  He denies a sore throat.  His wife is sick at home with a sinus infection and she is taking an antibiotic.  He denies fevers or chills but does complain of some muscle aches and pains.          Review of Systems   Constitutional:  Positive for fatigue. Negative for fever.   HENT:  Negative for sore throat and trouble swallowing.    Eyes:  Negative for visual disturbance.   Respiratory:  Negative for cough and shortness of breath.    Cardiovascular:  Negative for chest pain, palpitations and leg swelling.   Gastrointestinal:  Negative for abdominal pain, constipation, diarrhea, nausea and vomiting.   Genitourinary:  Negative for dysuria and frequency.   Musculoskeletal:  Positive for myalgias. Negative for arthralgias.   Skin:  Positive for rash.   Neurological:  Negative for dizziness and light-headedness.       Objective   Medication Documentation Review Audit       Reviewed by Audrey Mancia MD (Physician) on 12/09/24 at 0830      Medication Order Taking? Sig Documenting Provider Last Dose Status   ALPRAZolam (Xanax) 0.5 mg tablet [id number]  Take 1 tablet (0.5 mg) by mouth 2 times a day. Audrey Mancia MD  Active   amLODIPine (Norvasc) 10 mg tablet [id number]  TAKE 1 TABLET BY MOUTH ONCE DAILY AS DIRECTED Jo Dozier MD  Active   azelastine (Astelin) 137 mcg (0.1 %) nasal spray [id number]  Administer into affected nostril(s). Historical Provider, MD  Active   cholecalciferol (Vitamin D-3) 125 MCG (5000 UT) capsule [id number]  Take 1 capsule (125 mcg) by mouth once daily. Historical Provider, MD  Active   hydroCHLOROthiazide (Microzide)  "12.5 mg tablet [id number]  TAKE 1 TABLET BY MOUTH ONCE DAILY. Audrey Mancia MD  Active   lisinopril 20 mg tablet [id number]  Take 1 tablet by mouth once daily Audrey Mancia MD  Active   metoprolol succinate XL (Toprol-XL) 100 mg 24 hr tablet [id number]  Take 1 tablet (100 mg) by mouth once daily. Audrey Mancia MD  Active   montelukast (Singulair) 10 mg tablet [id number]  Take 1 tablet (10 mg) by mouth once daily at bedtime. Audrey Mancia MD   10/29/24 235   olopatadine (Patanol) 0.1 % ophthalmic solution [id number]  Administer 1 drop into both eyes 2 times a day as needed for allergies. Audrey Mancia MD   24 235   sertraline (Zoloft) 100 mg tablet [id number]  Take 1 tablet (100 mg) by mouth once daily. Audrey Mancia MD  Active   sildenafil (Viagra) 100 mg tablet [id number]  TAKE 1 TABLET BY MOUTH AS NEEDED FOR ERECTILE DYSFUNCTION Audrey Mancia MD  Active                  No Known Allergies    /76   Pulse 100   Ht 1.753 m (5' 9\")   Wt 123 kg (271 lb 6.4 oz)   SpO2 97%   BMI 40.08 kg/m²     Physical Exam  Constitutional:       Appearance: Normal appearance.   HENT:      Head: Normocephalic and atraumatic.      Nose: Nose normal.   Eyes:      Extraocular Movements: Extraocular movements intact.      Pupils: Pupils are equal, round, and reactive to light.   Cardiovascular:      Rate and Rhythm: Normal rate and regular rhythm.   Pulmonary:      Breath sounds: Normal breath sounds.   Abdominal:      General: Abdomen is flat. Bowel sounds are normal.      Palpations: Abdomen is soft.   Musculoskeletal:      Right lower leg: No edema.      Left lower leg: No edema.   Skin:     Comments: Patient has a slightly raised elevated red dark rash scattered in clusters all over his body including torso all 4 extremities.  He has not on the palms of his hands or the pads of his feet.  The rash is nonblanching.  It is not pruritic there are no vesicles and appears petechial in origin. "   Neurological:      Mental Status: He is alert.           Assessment/Plan   Problem List Items Addressed This Visit       Petechiae - Primary     Patient has a worsening petechial ranch as well as slightly worsening renal function last week.  At this time because of the muscle aches and going to check a CPK as well as ASO titers CRP sed rate JACK parvo B19 IgM and IgG CMP and CBC.    I would like to see if we can get him in with a dermatologist for possible biopsy today    Patient was told to hold his hydrochlorothiazide Until we know what his renal function is doing.    Rapid strep in the office was negative today.  Etiology of his petechial rash is still not clear at this time.         Relevant Orders    C-reactive protein    Sedimentation Rate    Comprehensive metabolic panel    CK    CBC and Auto Differential    Antistreptolysin O Titer    JACK with Reflex to MAGDALNEE    Parvovirus B19 Antibody, IgG IgM    Referral to Dermatology    Myalgia     Other Visit Diagnoses       Rash        Relevant Orders    C-reactive protein    Sedimentation Rate    Comprehensive metabolic panel    CK    CBC and Auto Differential    Antistreptolysin O Titer    JACK with Reflex to MAGDALENE    Parvovirus B19 Antibody, IgG IgM    Referral to Dermatology    POCT Rapid Strep A manually resulted (Completed)                   It has been a pleasure seeing you.  Audrey Mancia MD

## 2024-12-10 LAB — ANA SER QL HEP2 SUBST: NEGATIVE

## 2024-12-10 PROCEDURE — 88350 IMFLUOR EA ADDL 1ANTB STN PX: CPT | Performed by: DERMATOLOGY

## 2024-12-10 PROCEDURE — 88346 IMFLUOR 1ST 1ANTB STAIN PX: CPT | Performed by: DERMATOLOGY

## 2024-12-10 PROCEDURE — 88305 TISSUE EXAM BY PATHOLOGIST: CPT | Performed by: DERMATOLOGY

## 2024-12-11 ENCOUNTER — LAB (OUTPATIENT)
Dept: LAB | Facility: LAB | Age: 44
End: 2024-12-11
Payer: COMMERCIAL

## 2024-12-11 DIAGNOSIS — L95.8 OTHER VASCULITIS LIMITED TO THE SKIN: ICD-10-CM

## 2024-12-11 DIAGNOSIS — G71.038 LIMB-GIRDLE MUSCULAR DYSTROPHY R21 ASSOCIATED WITH MUTATION IN POGLUT1 GENE (MULTI): Primary | ICD-10-CM

## 2024-12-11 PROCEDURE — 86706 HEP B SURFACE ANTIBODY: CPT

## 2024-12-11 PROCEDURE — 86038 ANTINUCLEAR ANTIBODIES: CPT

## 2024-12-11 PROCEDURE — 86708 HEPATITIS A ANTIBODY: CPT

## 2024-12-11 PROCEDURE — 36415 COLL VENOUS BLD VENIPUNCTURE: CPT

## 2024-12-11 PROCEDURE — 81001 URINALYSIS AUTO W/SCOPE: CPT

## 2024-12-11 PROCEDURE — 86803 HEPATITIS C AB TEST: CPT

## 2024-12-12 ENCOUNTER — LAB REQUISITION (OUTPATIENT)
Dept: DERMATOPATHOLOGY | Facility: CLINIC | Age: 44
End: 2024-12-12
Payer: COMMERCIAL

## 2024-12-12 DIAGNOSIS — R21 RASH AND OTHER NONSPECIFIC SKIN ERUPTION: ICD-10-CM

## 2024-12-12 LAB
ANA SER QL HEP2 SUBST: NEGATIVE
APPEARANCE UR: ABNORMAL
B19V IGG SER IA-ACNC: 7.41 IV
B19V IGM SER IA-ACNC: 0.24 IV
BILIRUB UR STRIP.AUTO-MCNC: NEGATIVE MG/DL
COLOR UR: ABNORMAL
GLUCOSE UR STRIP.AUTO-MCNC: NORMAL MG/DL
HAV AB SER QL IA: NONREACTIVE
HBV SURFACE AB SER-ACNC: <3.1 MIU/ML
HCV AB SER QL: NONREACTIVE
KETONES UR STRIP.AUTO-MCNC: NEGATIVE MG/DL
LEUKOCYTE ESTERASE UR QL STRIP.AUTO: NEGATIVE
NITRITE UR QL STRIP.AUTO: NEGATIVE
PH UR STRIP.AUTO: 6.5 [PH]
PROT UR STRIP.AUTO-MCNC: ABNORMAL MG/DL
RBC # UR STRIP.AUTO: ABNORMAL /UL
RBC #/AREA URNS AUTO: ABNORMAL /HPF
SP GR UR STRIP.AUTO: 1.02
SQUAMOUS #/AREA URNS AUTO: ABNORMAL /HPF
UROBILINOGEN UR STRIP.AUTO-MCNC: NORMAL MG/DL
WBC #/AREA URNS AUTO: ABNORMAL /HPF

## 2024-12-18 LAB
LAB AP ASR DISCLAIMER: NORMAL
LABORATORY COMMENT REPORT: NORMAL
PATH REPORT.FINAL DX SPEC: NORMAL
PATH REPORT.GROSS SPEC: NORMAL
PATH REPORT.MICROSCOPIC SPEC OTHER STN: NORMAL
PATH REPORT.RELEVANT HX SPEC: NORMAL
PATH REPORT.TOTAL CANCER: NORMAL

## 2025-01-13 ENCOUNTER — APPOINTMENT (OUTPATIENT)
Dept: PRIMARY CARE | Facility: CLINIC | Age: 45
End: 2025-01-13
Payer: COMMERCIAL

## 2025-01-13 VITALS
WEIGHT: 266.4 LBS | OXYGEN SATURATION: 98 % | DIASTOLIC BLOOD PRESSURE: 68 MMHG | HEART RATE: 87 BPM | SYSTOLIC BLOOD PRESSURE: 120 MMHG | BODY MASS INDEX: 39.46 KG/M2 | HEIGHT: 69 IN

## 2025-01-13 DIAGNOSIS — R23.3 PETECHIAE: ICD-10-CM

## 2025-01-13 DIAGNOSIS — D69.0 HENOCH-SCHONLEIN PURPURA (CMS-HCC): ICD-10-CM

## 2025-01-13 DIAGNOSIS — F41.0 PANIC ATTACKS: ICD-10-CM

## 2025-01-13 DIAGNOSIS — I10 PRIMARY HYPERTENSION: Primary | ICD-10-CM

## 2025-01-13 DIAGNOSIS — F41.1 GENERALIZED ANXIETY DISORDER: ICD-10-CM

## 2025-01-13 PROCEDURE — 3074F SYST BP LT 130 MM HG: CPT | Performed by: INTERNAL MEDICINE

## 2025-01-13 PROCEDURE — 3008F BODY MASS INDEX DOCD: CPT | Performed by: INTERNAL MEDICINE

## 2025-01-13 PROCEDURE — 99214 OFFICE O/P EST MOD 30 MIN: CPT | Performed by: INTERNAL MEDICINE

## 2025-01-13 PROCEDURE — 3078F DIAST BP <80 MM HG: CPT | Performed by: INTERNAL MEDICINE

## 2025-01-13 RX ORDER — ALPRAZOLAM 0.5 MG/1
0.5 TABLET ORAL 2 TIMES DAILY
Qty: 180 TABLET | Refills: 0 | Status: SHIPPED | OUTPATIENT
Start: 2025-01-13

## 2025-01-13 ASSESSMENT — ENCOUNTER SYMPTOMS
VOMITING: 0
COUGH: 0
FEVER: 0
PALPITATIONS: 0
ARTHRALGIAS: 0
DIZZINESS: 0
FATIGUE: 0
DYSURIA: 0
CONSTIPATION: 0
DIARRHEA: 0
ABDOMINAL PAIN: 0
FREQUENCY: 0
SHORTNESS OF BREATH: 0
TROUBLE SWALLOWING: 0
LIGHT-HEADEDNESS: 0
SORE THROAT: 0
NAUSEA: 0

## 2025-01-13 ASSESSMENT — PATIENT HEALTH QUESTIONNAIRE - PHQ9
2. FEELING DOWN, DEPRESSED OR HOPELESS: NOT AT ALL
SUM OF ALL RESPONSES TO PHQ9 QUESTIONS 1 AND 2: 0
1. LITTLE INTEREST OR PLEASURE IN DOING THINGS: NOT AT ALL

## 2025-01-13 ASSESSMENT — PAIN SCALES - GENERAL: PAINLEVEL_OUTOF10: 0-NO PAIN

## 2025-01-13 NOTE — ASSESSMENT & PLAN NOTE
Patient has an anxiety disorder and panic attacks.  It has been worse while he was on the steroids and well evaluated being evaluated for his purpura.  Now that things are improving he is doing much better as well.  He was given a refill on his alprazolam now although it is not due until the 20th.

## 2025-01-13 NOTE — PROGRESS NOTES
OARRS:  Audrey Mancia MD on 1/13/2025  3:06 PM  I have personally reviewed the OARRS report for Obed Uribe. I have considered the risks of abuse, dependence, addiction and diversion    Is the patient prescribed a combination of a benzodiazepine and opioid?  No    Last Urine Drug Screen / ordered today: Yes  Recent Results (from the past 8760 hours)   Confirmation Opiate/Opioid/Benzo Prescription Compliance    Collection Time: 10/14/24 12:09 PM   Result Value Ref Range    Clonazepam <25 <25 ng/mL    7-Aminoclonazepam <25 <25 ng/mL    Alprazolam <25 <25 ng/mL    Alpha-Hydroxyalprazolam <25 <25 ng/mL    Midazolam <25 <25 ng/mL    Alpha-Hydroxymidazolam <25 <25 ng/mL    Chlordiazepoxide <25 <25 ng/mL    Diazepam <25 <25 ng/mL    Nordiazepam <25 <25 ng/mL    Temazepam <25 <25 ng/mL    Oxazepam <25 <25 ng/mL    Lorazepam <25 <25 ng/mL    Methadone <25 <25 ng/mL    EDDP <25 <25 ng/mL    6-Acetylmorphine <25 <25 ng/mL    Codeine <50 <50 ng/mL    Hydrocodone <25 <25 ng/mL    Hydromorphone <25 <25 ng/mL    Morphine  <50 <50 ng/mL    Norhydrocodone <25 <25 ng/mL    Noroxycodone <25 <25 ng/mL    Oxycodone <25 <25 ng/mL    Oxymorphone <25 <25 ng/mL    Fentanyl <2.5 <2.5 ng/mL    Norfentanyl <2.5 <2.5 ng/mL    Tramadol <50 <50 ng/mL    O-Desmethyltramadol <50 <50 ng/mL    Zolpidem <25 <25 ng/mL    Zolpidem Metabolite (ZCA) <25 <25 ng/mL   Screen Opiate/Opioid/Benzo Prescription Compliance    Collection Time: 10/14/24 12:09 PM   Result Value Ref Range    Creatinine, Urine Random 34.7 20.0 - 370.0 mg/dL    Amphetamine Screen, Urine Presumptive Negative Presumptive Negative    Barbiturate Screen, Urine Presumptive Negative Presumptive Negative    Cannabinoid Screen, Urine Presumptive Negative Presumptive Negative    Cocaine Metabolite Screen, Urine Presumptive Negative Presumptive Negative    PCP Screen, Urine Presumptive Negative Presumptive Negative     Results are as expected.         Controlled Substance Agreement:  Date  of the Last Agreement: 7/22/24  Reviewed Controlled Substance Agreement including but not limited to the benefits, risks, and alternatives to treatment with a Controlled Substance medication(s).    Benzodiazepines:  What is the patient's goal of therapy? To avoid panic attacks  Is this being achieved with current treatment? yes    LAMINE-7:  No data recorded    Activities of Daily Living:   Is your overall impression that this patient is benefiting (symptom reduction outweighs side effects) from benzodiazepine therapy? Yes     1. Physical Functioning: Same  2. Family Relationship: Better  3. Social Relationship: Better  4. Mood: Better  5. Sleep Patterns: Better  6. Overall Function: BetterSubjective   Patient ID: Obed Uribe is a 45 y.o. male who presents for HTN and general health management.    Patient is here for refill on his alprazolam for his anxiety disorder.  Last month he developed ITP and palpable purpura.  He was seen by dermatologist who did a biopsy and diagnosed him with Hench-Schonlein purpura.  His purpura are now all fading, platelets have returned to normal.        Review of Systems   Constitutional:  Negative for fatigue and fever.   HENT:  Negative for sore throat and trouble swallowing.    Eyes:  Negative for visual disturbance.   Respiratory:  Negative for cough and shortness of breath.    Cardiovascular:  Negative for chest pain, palpitations and leg swelling.   Gastrointestinal:  Negative for abdominal pain, constipation, diarrhea, nausea and vomiting.   Genitourinary:  Negative for dysuria and frequency.   Musculoskeletal:  Negative for arthralgias.   Skin:  Negative for rash.   Neurological:  Negative for dizziness and light-headedness.       Objective   Medication Documentation Review Audit       Reviewed by Audrye Mancia MD (Physician) on 01/13/25 at 1505      Medication Order Taking? Sig Documenting Provider Last Dose Status   ALPRAZolam (Xanax) 0.5 mg tablet [id number] Yes Take 1  "tablet (0.5 mg) by mouth 2 times a day. Audrey Mancia MD  Active   amLODIPine (Norvasc) 10 mg tablet [id number] Yes TAKE 1 TABLET BY MOUTH ONCE DAILY AS DIRECTED Jo Dozier MD  Active   azelastine (Astelin) 137 mcg (0.1 %) nasal spray [id number] Yes Administer into affected nostril(s). Historical Provider, MD  Active   cholecalciferol (Vitamin D-3) 125 MCG (5000 UT) capsule [id number] Yes Take 1 capsule (125 mcg) by mouth once daily. Historical Provider, MD  Active   hydroCHLOROthiazide (Microzide) 12.5 mg tablet [id number]  TAKE 1 TABLET BY MOUTH ONCE DAILY. Audrey Mancia MD  Active   lisinopril 20 mg tablet [id number] Yes Take 1 tablet by mouth once daily Audrey Mancia MD  Active   metoprolol succinate XL (Toprol-XL) 100 mg 24 hr tablet [id number] Yes Take 1 tablet (100 mg) by mouth once daily. Audrey Mancia MD  Active   montelukast (Singulair) 10 mg tablet [id number]  Take 1 tablet (10 mg) by mouth once daily at bedtime. Audrey Mancia MD   10/29/24 2359   olopatadine (Patanol) 0.1 % ophthalmic solution [id number]  Administer 1 drop into both eyes 2 times a day as needed for allergies. Audrey Mancia MD   24 2359   sertraline (Zoloft) 100 mg tablet [id number] Yes Take 1 tablet (100 mg) by mouth once daily. Audrey Mancia MD  Active   sildenafil (Viagra) 100 mg tablet [id number] Yes TAKE 1 TABLET BY MOUTH AS NEEDED FOR ERECTILE DYSFUNCTION Audrey Mancia MD  Active                  No Known Allergies    /68   Pulse 87   Ht 1.753 m (5' 9\")   Wt 121 kg (266 lb 6.4 oz)   SpO2 98%   BMI 39.34 kg/m²     Physical Exam  Constitutional:       Appearance: Normal appearance.   HENT:      Head: Normocephalic and atraumatic.      Nose: Nose normal.   Eyes:      Extraocular Movements: Extraocular movements intact.      Pupils: Pupils are equal, round, and reactive to light.   Cardiovascular:      Rate and Rhythm: Normal rate and regular rhythm.   Pulmonary:      Breath sounds: Normal breath " sounds.   Abdominal:      General: Abdomen is flat. Bowel sounds are normal.      Palpations: Abdomen is soft.   Musculoskeletal:      Right lower leg: No edema.      Left lower leg: No edema.   Skin:     Comments: Patient still has diffuse purpura on his torso and all 4 extremities but they are slowly fading.  There are few larger ones that are still palpable.  Patient was asked to get CBC and CMP today to continue to monitor his renal function and make sure he does not develop a full nephropathy or nephritis.   Neurological:      Mental Status: He is alert.           Assessment/Plan   Problem List Items Addressed This Visit       Anxiety disorder     Patient has an anxiety disorder and panic attacks.  It has been worse while he was on the steroids and well evaluated being evaluated for his purpura.  Now that things are improving he is doing much better as well.  He was given a refill on his alprazolam now although it is not due until the 20th.         Relevant Medications    ALPRAZolam (Xanax) 0.5 mg tablet    Hypertension - Primary     Blood pressure stable and well-controlled.  Initially was very mildly elevated but recheck at the end of the visit was fine.         Panic attacks    Petechiae    Henoch-Schonlein purpura (CMS-HCC)     Patient recently diagnosed with skin biopsy.  Since he did have an elevated white count at his last visit I will check CBC and CMP today we will monitor renal function closely it appears to be self-limited and already resolving at this time.  Patient was on steroids briefly but he has completed them.                   It has been a pleasure seeing you.  Audrey Mancia MD

## 2025-01-13 NOTE — ASSESSMENT & PLAN NOTE
Patient recently diagnosed with skin biopsy.  Since he did have an elevated white count at his last visit I will check CBC and CMP today we will monitor renal function closely it appears to be self-limited and already resolving at this time.  Patient was on steroids briefly but he has completed them.

## 2025-01-13 NOTE — ASSESSMENT & PLAN NOTE
Blood pressure stable and well-controlled.  Initially was very mildly elevated but recheck at the end of the visit was fine.

## 2025-01-28 ENCOUNTER — PATIENT MESSAGE (OUTPATIENT)
Dept: PRIMARY CARE | Facility: CLINIC | Age: 45
End: 2025-01-28
Payer: COMMERCIAL

## 2025-02-20 DIAGNOSIS — D69.0 HENOCH-SCHONLEIN PURPURA (CMS-HCC): Primary | ICD-10-CM

## 2025-03-04 LAB
ALBUMIN SERPL-MCNC: 4.1 G/DL (ref 3.6–5.1)
ALP SERPL-CCNC: 103 U/L (ref 36–130)
ALT SERPL-CCNC: 19 U/L (ref 9–46)
ANION GAP SERPL CALCULATED.4IONS-SCNC: 9 MMOL/L (CALC) (ref 7–17)
APPEARANCE UR: ABNORMAL
AST SERPL-CCNC: 15 U/L (ref 10–40)
BACTERIA #/AREA URNS HPF: ABNORMAL /HPF
BACTERIA UR CULT: ABNORMAL
BASOPHILS # BLD AUTO: 47 CELLS/UL (ref 0–200)
BASOPHILS NFR BLD AUTO: 0.7 %
BILIRUB SERPL-MCNC: 0.5 MG/DL (ref 0.2–1.2)
BILIRUB UR QL STRIP: POSITIVE
BUN SERPL-MCNC: 18 MG/DL (ref 7–25)
CALCIUM SERPL-MCNC: 9.1 MG/DL (ref 8.6–10.3)
CHLORIDE SERPL-SCNC: 102 MMOL/L (ref 98–110)
CO2 SERPL-SCNC: 27 MMOL/L (ref 20–32)
COLOR UR: YELLOW
CREAT SERPL-MCNC: 0.8 MG/DL (ref 0.6–1.29)
CRP SERPL-MCNC: 42.2 MG/L
EGFRCR SERPLBLD CKD-EPI 2021: 111 ML/MIN/1.73M2
EOSINOPHIL # BLD AUTO: 302 CELLS/UL (ref 15–500)
EOSINOPHIL NFR BLD AUTO: 4.5 %
ERYTHROCYTE [DISTWIDTH] IN BLOOD BY AUTOMATED COUNT: 14.8 % (ref 11–15)
ERYTHROCYTE [SEDIMENTATION RATE] IN BLOOD BY WESTERGREN METHOD: 41 MM/H
GLUCOSE SERPL-MCNC: 167 MG/DL (ref 65–99)
GLUCOSE UR QL STRIP: NEGATIVE
HCT VFR BLD AUTO: 41.2 % (ref 38.5–50)
HGB BLD-MCNC: 12.6 G/DL (ref 13.2–17.1)
HGB UR QL STRIP: ABNORMAL
HYALINE CASTS #/AREA URNS LPF: ABNORMAL /LPF
KETONES UR QL STRIP: NEGATIVE
LEUKOCYTE ESTERASE UR QL STRIP: NEGATIVE
LYMPHOCYTES # BLD AUTO: 884 CELLS/UL (ref 850–3900)
LYMPHOCYTES NFR BLD AUTO: 13.2 %
MCH RBC QN AUTO: 25.8 PG (ref 27–33)
MCHC RBC AUTO-ENTMCNC: 30.6 G/DL (ref 32–36)
MCV RBC AUTO: 84.4 FL (ref 80–100)
MONOCYTES # BLD AUTO: 476 CELLS/UL (ref 200–950)
MONOCYTES NFR BLD AUTO: 7.1 %
NEUTROPHILS # BLD AUTO: 4992 CELLS/UL (ref 1500–7800)
NEUTROPHILS NFR BLD AUTO: 74.5 %
NITRITE UR QL STRIP: NEGATIVE
PH UR STRIP: 6 [PH] (ref 5–8)
PLATELET # BLD AUTO: 287 THOUSAND/UL (ref 140–400)
PMV BLD REES-ECKER: 11.7 FL (ref 7.5–12.5)
POTASSIUM SERPL-SCNC: 4.2 MMOL/L (ref 3.5–5.3)
PROT SERPL-MCNC: 7 G/DL (ref 6.1–8.1)
PROT UR QL STRIP: ABNORMAL
RBC # BLD AUTO: 4.88 MILLION/UL (ref 4.2–5.8)
RBC #/AREA URNS HPF: ABNORMAL /HPF
SERVICE CMNT-IMP: ABNORMAL
SODIUM SERPL-SCNC: 138 MMOL/L (ref 135–146)
SP GR UR STRIP: 1.03 (ref 1–1.03)
SQUAMOUS #/AREA URNS HPF: ABNORMAL /HPF
WBC # BLD AUTO: 6.7 THOUSAND/UL (ref 3.8–10.8)
WBC #/AREA URNS HPF: ABNORMAL /HPF

## 2025-03-05 ENCOUNTER — TELEPHONE (OUTPATIENT)
Dept: PRIMARY CARE | Facility: CLINIC | Age: 45
End: 2025-03-05
Payer: COMMERCIAL

## 2025-03-05 DIAGNOSIS — D69.0 HENOCH-SCHONLEIN PURPURA (CMS-HCC): Primary | ICD-10-CM

## 2025-03-05 NOTE — TELEPHONE ENCOUNTER
----- Message from Audrey Mancia sent at 3/5/2025 11:03 AM EST -----  Please notify patient that his sedimentation rate and C-reactive protein are both elevated.  He is also showing protein in the urine which means that his Henoch-Schoenlein purpura is affecting his kidneys.  I would like him to see a kidney specialist or nephrologist as soon as possible.  I have placed an urgent referral order for him to see a kidney specialist.  Please have one of the  help him set up this appointment to be seen as soon as possible

## 2025-03-06 DIAGNOSIS — F41.0 PANIC ATTACKS: Primary | ICD-10-CM

## 2025-03-06 RX ORDER — HYDROXYZINE HYDROCHLORIDE 25 MG/1
25 TABLET, FILM COATED ORAL 3 TIMES DAILY
Qty: 90 TABLET | Refills: 0 | Status: SHIPPED | OUTPATIENT
Start: 2025-03-06 | End: 2025-04-05

## 2025-03-10 ENCOUNTER — APPOINTMENT (OUTPATIENT)
Dept: NEPHROLOGY | Facility: CLINIC | Age: 45
End: 2025-03-10
Payer: COMMERCIAL

## 2025-03-10 VITALS
OXYGEN SATURATION: 98 % | WEIGHT: 261 LBS | HEART RATE: 83 BPM | BODY MASS INDEX: 38.54 KG/M2 | SYSTOLIC BLOOD PRESSURE: 144 MMHG | DIASTOLIC BLOOD PRESSURE: 84 MMHG

## 2025-03-10 DIAGNOSIS — I10 PRIMARY HYPERTENSION: ICD-10-CM

## 2025-03-10 DIAGNOSIS — R80.1 PERSISTENT PROTEINURIA: ICD-10-CM

## 2025-03-10 DIAGNOSIS — D69.0 HENOCH-SCHONLEIN PURPURA (CMS-HCC): Primary | ICD-10-CM

## 2025-03-10 DIAGNOSIS — N02.9 IDIOPATHIC HEMATURIA, UNSPECIFIED WHETHER GLOMERULAR MORPHOLOGIC CHANGES PRESENT: ICD-10-CM

## 2025-03-10 PROBLEM — R31.9 HEMATURIA: Status: ACTIVE | Noted: 2025-03-10

## 2025-03-10 PROCEDURE — 3077F SYST BP >= 140 MM HG: CPT | Performed by: INTERNAL MEDICINE

## 2025-03-10 PROCEDURE — 3079F DIAST BP 80-89 MM HG: CPT | Performed by: INTERNAL MEDICINE

## 2025-03-10 PROCEDURE — 99204 OFFICE O/P NEW MOD 45 MIN: CPT | Performed by: INTERNAL MEDICINE

## 2025-03-10 ASSESSMENT — ENCOUNTER SYMPTOMS
HEMATOLOGIC/LYMPHATIC NEGATIVE: 1
ALLERGIC/IMMUNOLOGIC NEGATIVE: 1
ENDOCRINE NEGATIVE: 1
CONSTITUTIONAL NEGATIVE: 1
RESPIRATORY NEGATIVE: 1
CARDIOVASCULAR NEGATIVE: 1
MUSCULOSKELETAL NEGATIVE: 1
EYES NEGATIVE: 1
NEUROLOGICAL NEGATIVE: 1
GASTROINTESTINAL NEGATIVE: 1
PSYCHIATRIC NEGATIVE: 1

## 2025-03-10 NOTE — PROGRESS NOTES
Subjective   He was diagnosed with HSP in December 2024  Never had anything like this before.  He states he barely had any ruing to give at that time  He has no swelling  Off and on purpura that is scatttered  Off and On abdominal pain.      Patient ID: Obed Uribe is a 45 y.o. male who presents for Consult.  HPI  He is here for new patient visit  He is referred here secondary to renal dysfunction  My evaluation and plan is communicated back via the electronic medical record  His note states that he just wants to get his kidney function back to normal.  He states he does not feel like he has any issues but that he is told by his primary that he does  A urinalysis was performed on March 3  There is positive blood positive protein 10-20 red blood cells moderate bacteria he had an elevated C-reactive protein and sed rate.  He has a history of Henoch-Schönlein purpura.  His metabolic panel shows a glucose of 167 BUN is 18 with a creatinine of 0.8 electrolytes look good the albumin in his blood is 4.1 total protein is 7.0 hemoglobin is 12.6 hepatitis B and C are unremarkable JACK is unremarkable    Medications are reviewed he is on benzodiazepine amlodipine vitamin D lisinopril metoprolol hydrochlorothiazide blood pressure here in the office is 144/84  Review of Systems   Constitutional: Negative.    HENT: Negative.     Eyes: Negative.    Respiratory: Negative.     Cardiovascular: Negative.    Gastrointestinal: Negative.    Endocrine: Negative.    Genitourinary: Negative.    Musculoskeletal: Negative.    Skin: Negative.    Allergic/Immunologic: Negative.    Neurological: Negative.    Hematological: Negative.    Psychiatric/Behavioral: Negative.         Objective   Physical Exam  Constitutional:       Appearance: Normal appearance.   HENT:      Head: Normocephalic and atraumatic.      Right Ear: External ear normal.      Left Ear: External ear normal.      Nose: Nose normal.      Mouth/Throat:      Mouth: Mucous  membranes are moist.      Pharynx: Oropharynx is clear.   Eyes:      Extraocular Movements: Extraocular movements intact.      Conjunctiva/sclera: Conjunctivae normal.      Pupils: Pupils are equal, round, and reactive to light.   Cardiovascular:      Rate and Rhythm: Normal rate and regular rhythm.   Pulmonary:      Effort: Pulmonary effort is normal.      Breath sounds: Normal breath sounds.   Abdominal:      General: Abdomen is flat.      Palpations: Abdomen is soft.   Skin:     General: Skin is warm and dry.      Findings: Lesion and rash present.   Neurological:      General: No focal deficit present.      Mental Status: He is alert and oriented to person, place, and time.   Psychiatric:         Mood and Affect: Mood normal.         Behavior: Behavior normal.         Assessment/Plan   Problem List Items Addressed This Visit             ICD-10-CM    Hypertension I10    Henoch-Schonlein purpura (CMS-HCC) - Primary D69.0    Relevant Orders    Comprehensive metabolic panel    Albumin-Creatinine Ratio, Urine Random    Urinalysis with Reflex Microscopic    US renal complete    Serum Protein Electrophoresis + Immunofixation    CBC    Persistent proteinuria R80.1    Relevant Orders    Follow Up In Nephrology    Hematuria R31.9   Plan:   Recheck labs.  Albumin is normal.  Has had treatment with Steroids.  Get Renal US  Will decide at that time if we need to do a renal biopsy.  He states BP at home is running well.  Keep same meds for now.  Plan follow up in 1 month.      History of Henoch-Schönlein purpura  IgA vasculitis: Possible kidney involvement  Proteinuria without hypoalbuminemia  Hematuria  Hypertension with suboptimal control blood pressure here in the office 144/84       Evens Rice DO 03/10/25 10:17 AM

## 2025-03-21 ENCOUNTER — HOSPITAL ENCOUNTER (OUTPATIENT)
Dept: RADIOLOGY | Facility: CLINIC | Age: 45
Discharge: HOME | End: 2025-03-21
Payer: COMMERCIAL

## 2025-03-21 ENCOUNTER — APPOINTMENT (OUTPATIENT)
Dept: LAB | Facility: HOSPITAL | Age: 45
End: 2025-03-21
Payer: COMMERCIAL

## 2025-03-21 DIAGNOSIS — D69.0 HENOCH-SCHONLEIN PURPURA (CMS-HCC): ICD-10-CM

## 2025-03-21 LAB — PROT SERPL-MCNC: 6.4 G/DL (ref 6.4–8.2)

## 2025-03-21 PROCEDURE — 84165 PROTEIN E-PHORESIS SERUM: CPT

## 2025-03-21 PROCEDURE — 84155 ASSAY OF PROTEIN SERUM: CPT

## 2025-03-21 PROCEDURE — 76770 US EXAM ABDO BACK WALL COMP: CPT

## 2025-03-21 PROCEDURE — 86334 IMMUNOFIX E-PHORESIS SERUM: CPT

## 2025-03-22 LAB
ALBUMIN SERPL-MCNC: 3.8 G/DL (ref 3.6–5.1)
ALBUMIN/CREAT UR: NORMAL
ALP SERPL-CCNC: 79 U/L (ref 36–130)
ALT SERPL-CCNC: 22 U/L (ref 9–46)
ANION GAP SERPL CALCULATED.4IONS-SCNC: 7 MMOL/L (CALC) (ref 7–17)
APPEARANCE UR: CLEAR
AST SERPL-CCNC: 16 U/L (ref 10–40)
BACTERIA #/AREA URNS HPF: ABNORMAL /HPF
BILIRUB SERPL-MCNC: 0.3 MG/DL (ref 0.2–1.2)
BILIRUB UR QL STRIP: NEGATIVE
BUN SERPL-MCNC: 26 MG/DL (ref 7–25)
CALCIUM SERPL-MCNC: 9 MG/DL (ref 8.6–10.3)
CHLORIDE SERPL-SCNC: 101 MMOL/L (ref 98–110)
CO2 SERPL-SCNC: 30 MMOL/L (ref 20–32)
COLOR UR: YELLOW
CREAT SERPL-MCNC: 1.18 MG/DL (ref 0.6–1.29)
CREAT UR-MCNC: NORMAL MG/DL
EGFRCR SERPLBLD CKD-EPI 2021: 78 ML/MIN/1.73M2
ERYTHROCYTE [DISTWIDTH] IN BLOOD BY AUTOMATED COUNT: 14.8 % (ref 11–15)
GLUCOSE SERPL-MCNC: 117 MG/DL (ref 65–99)
GLUCOSE UR QL STRIP: NEGATIVE
HCT VFR BLD AUTO: 37.1 % (ref 38.5–50)
HGB BLD-MCNC: 11.5 G/DL (ref 13.2–17.1)
HGB UR QL STRIP: ABNORMAL
HYALINE CASTS #/AREA URNS LPF: ABNORMAL /LPF
KETONES UR QL STRIP: NEGATIVE
LEUKOCYTE ESTERASE UR QL STRIP: ABNORMAL
MCH RBC QN AUTO: 25.7 PG (ref 27–33)
MCHC RBC AUTO-ENTMCNC: 31 G/DL (ref 32–36)
MCV RBC AUTO: 82.8 FL (ref 80–100)
MICROALBUMIN UR-MCNC: NORMAL
NITRITE UR QL STRIP: NEGATIVE
PH UR STRIP: 5.5 [PH] (ref 5–8)
PLATELET # BLD AUTO: 273 THOUSAND/UL (ref 140–400)
PMV BLD REES-ECKER: 11.6 FL (ref 7.5–12.5)
POTASSIUM SERPL-SCNC: 4 MMOL/L (ref 3.5–5.3)
PROT SERPL-MCNC: 6.7 G/DL (ref 6.1–8.1)
PROT UR QL STRIP: ABNORMAL
RBC # BLD AUTO: 4.48 MILLION/UL (ref 4.2–5.8)
RBC #/AREA URNS HPF: ABNORMAL /HPF
SERVICE CMNT-IMP: ABNORMAL
SODIUM SERPL-SCNC: 138 MMOL/L (ref 135–146)
SP GR UR STRIP: 1.03 (ref 1–1.03)
SQUAMOUS #/AREA URNS HPF: ABNORMAL /HPF
WBC # BLD AUTO: 11 THOUSAND/UL (ref 3.8–10.8)
WBC #/AREA URNS HPF: ABNORMAL /HPF

## 2025-03-24 LAB
ALBUMIN SERPL-MCNC: 3.8 G/DL (ref 3.6–5.1)
ALBUMIN/CREAT UR: 877 MG/G CREAT
ALP SERPL-CCNC: 79 U/L (ref 36–130)
ALT SERPL-CCNC: 22 U/L (ref 9–46)
ANION GAP SERPL CALCULATED.4IONS-SCNC: 7 MMOL/L (CALC) (ref 7–17)
APPEARANCE UR: CLEAR
AST SERPL-CCNC: 16 U/L (ref 10–40)
BACTERIA #/AREA URNS HPF: ABNORMAL /HPF
BILIRUB SERPL-MCNC: 0.3 MG/DL (ref 0.2–1.2)
BILIRUB UR QL STRIP: NEGATIVE
BUN SERPL-MCNC: 26 MG/DL (ref 7–25)
CALCIUM SERPL-MCNC: 9 MG/DL (ref 8.6–10.3)
CHLORIDE SERPL-SCNC: 101 MMOL/L (ref 98–110)
CO2 SERPL-SCNC: 30 MMOL/L (ref 20–32)
COLOR UR: YELLOW
CREAT SERPL-MCNC: 1.18 MG/DL (ref 0.6–1.29)
CREAT UR-MCNC: 202 MG/DL (ref 20–320)
EGFRCR SERPLBLD CKD-EPI 2021: 78 ML/MIN/1.73M2
GLUCOSE SERPL-MCNC: 117 MG/DL (ref 65–99)
GLUCOSE UR QL STRIP: NEGATIVE
HGB UR QL STRIP: ABNORMAL
HYALINE CASTS #/AREA URNS LPF: ABNORMAL /LPF
KETONES UR QL STRIP: NEGATIVE
LEUKOCYTE ESTERASE UR QL STRIP: ABNORMAL
MICROALBUMIN UR-MCNC: 177.2 MG/DL
NITRITE UR QL STRIP: NEGATIVE
PH UR STRIP: 5.5 [PH] (ref 5–8)
POTASSIUM SERPL-SCNC: 4 MMOL/L (ref 3.5–5.3)
PROT SERPL-MCNC: 6.7 G/DL (ref 6.1–8.1)
PROT UR QL STRIP: ABNORMAL
RBC #/AREA URNS HPF: ABNORMAL /HPF
SERVICE CMNT-IMP: ABNORMAL
SODIUM SERPL-SCNC: 138 MMOL/L (ref 135–146)
SP GR UR STRIP: 1.03 (ref 1–1.03)
SQUAMOUS #/AREA URNS HPF: ABNORMAL /HPF
WBC #/AREA URNS HPF: ABNORMAL /HPF

## 2025-03-26 LAB
ALBUMIN: 3.5 G/DL (ref 3.4–5)
ALPHA 1 GLOBULIN: 0.4 G/DL (ref 0.2–0.6)
ALPHA 2 GLOBULIN: 0.6 G/DL (ref 0.4–1.1)
BETA GLOBULIN: 0.9 G/DL (ref 0.5–1.2)
GAMMA GLOBULIN: 1 G/DL (ref 0.5–1.4)
IMMUNOFIXATION COMMENT: NORMAL
PATH REVIEW - SERUM IMMUNOFIXATION: NORMAL
PATH REVIEW-SERUM PROTEIN ELECTROPHORESIS: NORMAL
PROTEIN ELECTROPHORESIS COMMENT: NORMAL

## 2025-03-28 DIAGNOSIS — F41.0 PANIC ATTACKS: ICD-10-CM

## 2025-03-30 RX ORDER — HYDROXYZINE HYDROCHLORIDE 25 MG/1
25 TABLET, FILM COATED ORAL 3 TIMES DAILY
Qty: 270 TABLET | Refills: 1 | OUTPATIENT
Start: 2025-03-30

## 2025-04-03 ENCOUNTER — APPOINTMENT (OUTPATIENT)
Dept: NEPHROLOGY | Facility: CLINIC | Age: 45
End: 2025-04-03
Payer: COMMERCIAL

## 2025-04-03 VITALS
DIASTOLIC BLOOD PRESSURE: 78 MMHG | WEIGHT: 258.6 LBS | HEIGHT: 69 IN | BODY MASS INDEX: 38.3 KG/M2 | SYSTOLIC BLOOD PRESSURE: 130 MMHG | HEART RATE: 79 BPM

## 2025-04-03 DIAGNOSIS — R31.21 ASYMPTOMATIC MICROSCOPIC HEMATURIA: ICD-10-CM

## 2025-04-03 DIAGNOSIS — R80.1 PERSISTENT PROTEINURIA: ICD-10-CM

## 2025-04-03 DIAGNOSIS — I10 PRIMARY HYPERTENSION: ICD-10-CM

## 2025-04-03 DIAGNOSIS — D69.0 HENOCH-SCHONLEIN PURPURA: ICD-10-CM

## 2025-04-03 DIAGNOSIS — G47.33 OSA (OBSTRUCTIVE SLEEP APNEA): ICD-10-CM

## 2025-04-03 DIAGNOSIS — D69.0 IGA MEDIATED LEUKOCYTOCLASTIC VASCULITIS (CMS-HCC): Primary | ICD-10-CM

## 2025-04-03 PROCEDURE — 3075F SYST BP GE 130 - 139MM HG: CPT | Performed by: INTERNAL MEDICINE

## 2025-04-03 PROCEDURE — 3078F DIAST BP <80 MM HG: CPT | Performed by: INTERNAL MEDICINE

## 2025-04-03 PROCEDURE — 3008F BODY MASS INDEX DOCD: CPT | Performed by: INTERNAL MEDICINE

## 2025-04-03 PROCEDURE — 99215 OFFICE O/P EST HI 40 MIN: CPT | Performed by: INTERNAL MEDICINE

## 2025-04-03 RX ORDER — MYCOPHENOLATE MOFETIL 500 MG/1
500 TABLET ORAL 2 TIMES DAILY
Qty: 180 TABLET | Refills: 3 | Status: SHIPPED | OUTPATIENT
Start: 2025-04-03 | End: 2026-04-03

## 2025-04-03 RX ORDER — PANTOPRAZOLE SODIUM 40 MG/1
40 TABLET, DELAYED RELEASE ORAL
Qty: 90 TABLET | Refills: 3 | Status: SHIPPED | OUTPATIENT
Start: 2025-04-03 | End: 2026-04-03

## 2025-04-03 RX ORDER — PREDNISONE 20 MG/1
60 TABLET ORAL DAILY
Qty: 270 TABLET | Refills: 3 | Status: SHIPPED | OUTPATIENT
Start: 2025-04-03 | End: 2026-04-03

## 2025-04-03 ASSESSMENT — ENCOUNTER SYMPTOMS
PSYCHIATRIC NEGATIVE: 1
ALLERGIC/IMMUNOLOGIC NEGATIVE: 1
ENDOCRINE NEGATIVE: 1
HEMATOLOGIC/LYMPHATIC NEGATIVE: 1
CARDIOVASCULAR NEGATIVE: 1
GASTROINTESTINAL NEGATIVE: 1
MUSCULOSKELETAL NEGATIVE: 1
EYES NEGATIVE: 1
NEUROLOGICAL NEGATIVE: 1
CONSTITUTIONAL NEGATIVE: 1
RESPIRATORY NEGATIVE: 1

## 2025-04-03 NOTE — PROGRESS NOTES
Subjective   He has rash all over.   He is pretty miserable.    Really wants to avoid another biopsy.      Patient ID: Obed Uribe is a 45 y.o. male who presents for Follow-up (1 month/Review Labs & Renal US).  HPI  He is here for follow-up secondary to history of Henoch-Schönlein purpura with IgA vasculitis and proteinuria.  Also had hematuria.  Labs were completed on March 21  Metabolic panel shows a glucose of 117 BUN is 26 with a creatinine of 1.18 with an estimated GFR of 78 sodium potassium normal bicarb is 30 calcium 9 total protein is 6.7 with an albumin of 3.8  Urine albumin creatinine ratio is elevated at 877  Urinalysis positive for blood and protein  Hemoglobin shows an elevated white blood cell count of 11 hemoglobin is 11.5  A serum protein electrophoresis shows no monoclonal antibodies  Blood pressure here in the office is 130/78 medications are reviewed he is on Xanax amlodipine vitamin D lisinopril metoprolol Zoloft   A renal ultrasound was performed.  It appears pretty normal the left kidney is 13.1 cm the right    Review of Systems   Constitutional: Negative.    HENT: Negative.     Eyes: Negative.    Respiratory: Negative.     Cardiovascular: Negative.    Gastrointestinal: Negative.    Endocrine: Negative.    Genitourinary: Negative.    Musculoskeletal: Negative.    Skin:  Positive for rash.   Allergic/Immunologic: Negative.    Neurological: Negative.    Hematological: Negative.    Psychiatric/Behavioral: Negative.         Objective   Physical Exam  Constitutional:       Appearance: Normal appearance.   HENT:      Head: Normocephalic and atraumatic.      Right Ear: External ear normal.      Left Ear: External ear normal.      Nose: Nose normal.      Mouth/Throat:      Mouth: Mucous membranes are moist.      Pharynx: Oropharynx is clear.   Eyes:      Extraocular Movements: Extraocular movements intact.      Conjunctiva/sclera: Conjunctivae normal.      Pupils: Pupils are equal, round, and  reactive to light.   Cardiovascular:      Rate and Rhythm: Normal rate and regular rhythm.   Pulmonary:      Effort: Pulmonary effort is normal.      Breath sounds: Normal breath sounds.   Abdominal:      General: Abdomen is flat.      Palpations: Abdomen is soft.   Skin:     General: Skin is warm and dry.      Findings: Erythema and rash present.   Neurological:      General: No focal deficit present.      Mental Status: He is alert and oriented to person, place, and time.   Psychiatric:         Mood and Affect: Mood normal.         Behavior: Behavior normal.         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

## 2025-04-14 ENCOUNTER — APPOINTMENT (OUTPATIENT)
Dept: PRIMARY CARE | Facility: CLINIC | Age: 45
End: 2025-04-14
Payer: COMMERCIAL

## 2025-04-14 VITALS
OXYGEN SATURATION: 95 % | SYSTOLIC BLOOD PRESSURE: 153 MMHG | WEIGHT: 274.6 LBS | BODY MASS INDEX: 40.67 KG/M2 | HEART RATE: 76 BPM | DIASTOLIC BLOOD PRESSURE: 87 MMHG | HEIGHT: 69 IN

## 2025-04-14 DIAGNOSIS — D69.0 HENOCH-SCHONLEIN PURPURA: ICD-10-CM

## 2025-04-14 DIAGNOSIS — F41.1 GENERALIZED ANXIETY DISORDER: ICD-10-CM

## 2025-04-14 DIAGNOSIS — D69.0 IGA MEDIATED LEUKOCYTOCLASTIC VASCULITIS (CMS-HCC): ICD-10-CM

## 2025-04-14 DIAGNOSIS — R23.3 PETECHIAE: ICD-10-CM

## 2025-04-14 DIAGNOSIS — I10 PRIMARY HYPERTENSION: Primary | ICD-10-CM

## 2025-04-14 PROCEDURE — 3079F DIAST BP 80-89 MM HG: CPT | Performed by: INTERNAL MEDICINE

## 2025-04-14 PROCEDURE — 3077F SYST BP >= 140 MM HG: CPT | Performed by: INTERNAL MEDICINE

## 2025-04-14 PROCEDURE — 99214 OFFICE O/P EST MOD 30 MIN: CPT | Performed by: INTERNAL MEDICINE

## 2025-04-14 PROCEDURE — 3008F BODY MASS INDEX DOCD: CPT | Performed by: INTERNAL MEDICINE

## 2025-04-14 RX ORDER — ALPRAZOLAM 0.5 MG/1
0.5 TABLET ORAL 2 TIMES DAILY
Qty: 180 TABLET | Refills: 0 | Status: SHIPPED | OUTPATIENT
Start: 2025-04-14

## 2025-04-14 ASSESSMENT — ENCOUNTER SYMPTOMS
CONSTIPATION: 0
COUGH: 0
FEVER: 0
FREQUENCY: 0
NAUSEA: 0
DIZZINESS: 0
ARTHRALGIAS: 0
FATIGUE: 0
LIGHT-HEADEDNESS: 0
SORE THROAT: 0
VOMITING: 0
DYSURIA: 0
PALPITATIONS: 0
ABDOMINAL PAIN: 0
TROUBLE SWALLOWING: 0
DIARRHEA: 0
SHORTNESS OF BREATH: 0

## 2025-04-14 ASSESSMENT — PATIENT HEALTH QUESTIONNAIRE - PHQ9
2. FEELING DOWN, DEPRESSED OR HOPELESS: NOT AT ALL
1. LITTLE INTEREST OR PLEASURE IN DOING THINGS: NOT AT ALL
SUM OF ALL RESPONSES TO PHQ9 QUESTIONS 1 AND 2: 0

## 2025-04-14 ASSESSMENT — PAIN SCALES - GENERAL: PAINLEVEL_OUTOF10: 0-NO PAIN

## 2025-04-14 NOTE — ASSESSMENT & PLAN NOTE
Blood pressure is elevated today and I did not get a chance to recheck it so I will follow-up and watch his blood pressure readings at his nephrologist appointment.

## 2025-04-14 NOTE — ASSESSMENT & PLAN NOTE
Patient is now on high-dose steroids for an indefinite period of time through his nephrologist for treatment of his vasculitis.  We did discuss weight gain elevated sugars and potential steroid-induced diabetes.  If necessary I will start him on insulin.

## 2025-04-14 NOTE — PROGRESS NOTES
OARRS:  Audrey Mancia MD on 4/14/2025  3:03 PM  I have personally reviewed the OARRS report for Obed Uribe. I have considered the risks of abuse, dependence, addiction and diversion    Is the patient prescribed a combination of a benzodiazepine and opioid?  No    Last Urine Drug Screen / ordered today: Yes  Recent Results (from the past 8760 hours)   Confirmation Opiate/Opioid/Benzo Prescription Compliance    Collection Time: 10/14/24 12:09 PM   Result Value Ref Range    Clonazepam <25 <25 ng/mL    7-Aminoclonazepam <25 <25 ng/mL    Alprazolam <25 <25 ng/mL    Alpha-Hydroxyalprazolam <25 <25 ng/mL    Midazolam <25 <25 ng/mL    Alpha-Hydroxymidazolam <25 <25 ng/mL    Chlordiazepoxide <25 <25 ng/mL    Diazepam <25 <25 ng/mL    Nordiazepam <25 <25 ng/mL    Temazepam <25 <25 ng/mL    Oxazepam <25 <25 ng/mL    Lorazepam <25 <25 ng/mL    Methadone <25 <25 ng/mL    EDDP <25 <25 ng/mL    6-Acetylmorphine <25 <25 ng/mL    Codeine <50 <50 ng/mL    Hydrocodone <25 <25 ng/mL    Hydromorphone <25 <25 ng/mL    Morphine  <50 <50 ng/mL    Norhydrocodone <25 <25 ng/mL    Noroxycodone <25 <25 ng/mL    Oxycodone <25 <25 ng/mL    Oxymorphone <25 <25 ng/mL    Fentanyl <2.5 <2.5 ng/mL    Norfentanyl <2.5 <2.5 ng/mL    Tramadol <50 <50 ng/mL    O-Desmethyltramadol <50 <50 ng/mL    Zolpidem <25 <25 ng/mL    Zolpidem Metabolite (ZCA) <25 <25 ng/mL   Screen Opiate/Opioid/Benzo Prescription Compliance    Collection Time: 10/14/24 12:09 PM   Result Value Ref Range    Creatinine, Urine Random 34.7 20.0 - 370.0 mg/dL    Amphetamine Screen, Urine Presumptive Negative Presumptive Negative    Barbiturate Screen, Urine Presumptive Negative Presumptive Negative    Cannabinoid Screen, Urine Presumptive Negative Presumptive Negative    Cocaine Metabolite Screen, Urine Presumptive Negative Presumptive Negative    PCP Screen, Urine Presumptive Negative Presumptive Negative     Results are as expected.         Controlled Substance Agreement:  Date  of the Last Agreement: 7/22/24  Reviewed Controlled Substance Agreement including but not limited to the benefits, risks, and alternatives to treatment with a Controlled Substance medication(s).    Benzodiazepines:  What is the patient's goal of therapy? To reduce anxiety  Is this being achieved with current treatment? 10/14/24    LAMINE-7:  No data recorded    Activities of Daily Living:   Is your overall impression that this patient is benefiting (symptom reduction outweighs side effects) from benzodiazepine therapy? Yes     1. Physical Functioning: Same  2. Family Relationship: Better  3. Social Relationship: Better  4. Mood: Better  5. Sleep Patterns: Better  6. Overall Function: BetterSubjective   Patient ID: Obed Uribe is a 45 y.o. male who presents for No chief complaint on file..  Patient is here to refill his controlled substance agreement for alprazolam for his anxiety.  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  He is now on high-dose steroids has been gaining weight and I am concerned that he may becoming diabetic.  We will be watching his labs closely and if his sugars go too high he may need to go on insulin for steroid-induced diabetes.  Patient is also overdue for lipid profile so  He has fasting labs drawn he will get blood work for me as well        Review of Systems   Constitutional:  Negative for fatigue and fever.   HENT:  Negative for sore throat and trouble swallowing.    Eyes:  Negative for visual disturbance.   Respiratory:  Negative for cough and shortness of breath.    Cardiovascular:  Negative for chest pain, palpitations and leg swelling.   Gastrointestinal:  Negative for abdominal pain, constipation, diarrhea, nausea and vomiting.   Genitourinary:  Negative for dysuria and frequency.   Musculoskeletal:  Negative for arthralgias.   Skin:  Positive for rash.   Neurological:  Negative for dizziness and light-headedness.       Objective   Medication  "Documentation Review Audit       Reviewed by Audrey Mancia MD (Physician) on 25 at 1457      Medication Order Taking? Sig Documenting Provider Last Dose Status   ALPRAZolam (Xanax) 0.5 mg tablet [id number] Yes Take 1 tablet (0.5 mg) by mouth 2 times a day. Audrey Mancia MD  Active   amLODIPine (Norvasc) 10 mg tablet [id number] Yes TAKE 1 TABLET BY MOUTH ONCE DAILY AS DIRECTED Audrey Mancia MD  Active   azelastine (Astelin) 137 mcg (0.1 %) nasal spray [id number] Yes Administer into affected nostril(s). Historical Provider, MD  Active   cholecalciferol (Vitamin D-3) 125 MCG (5000 UT) capsule [id number] Yes Take 1 capsule (125 mcg) by mouth once daily. Historical Provider, MD  Active   hydrOXYzine HCL (Atarax) 25 mg tablet [id number]  Take 1 tablet (25 mg) by mouth 3 times a day.   Patient not taking: Reported on 2025    Audrey Mancia MD   25 2359   lisinopril 20 mg tablet [id number] Yes Take 1 tablet (20 mg) by mouth once daily. Audrey Mancia MD  Active   metoprolol succinate XL (Toprol-XL) 100 mg 24 hr tablet [id number] Yes Take 1 tablet (100 mg) by mouth once daily. Audrey Mancia MD  Active   mycophenolate (CellCept) 500 mg tablet [id number] Yes Take 1 tablet (500 mg) by mouth 2 times a day. Evens Rice DO  Active   pantoprazole (Protonix) 40 mg EC tablet [id number] Yes Take 1 tablet (40 mg) by mouth once daily in the morning. Take before meals. Do not crush, chew, or split. Evens Rice DO  Active   predniSONE (Deltasone) 20 mg tablet [id number] Yes Take 3 tablets (60 mg) by mouth once daily. Evens Rice DO  Active   sertraline (Zoloft) 100 mg tablet [id number] Yes Take 1 tablet by mouth once daily Audrey Mancia MD  Active   sildenafil (Viagra) 100 mg tablet [id number] Yes TAKE 1 TABLET BY MOUTH AS NEEDED FOR ERECTILE DYSFUNCTION Audrey Mancia MD  Active                  No Known Allergies    /87   Pulse 76   Ht 1.753 m (5' 9\")   Wt 125 kg (274 lb 9.6 oz)   SpO2 95%   " BMI 40.55 kg/m²     Physical Exam  Constitutional:       Appearance: Normal appearance. He is obese.   HENT:      Head: Normocephalic and atraumatic.      Nose: Nose normal.   Eyes:      Extraocular Movements: Extraocular movements intact.      Pupils: Pupils are equal, round, and reactive to light.   Cardiovascular:      Rate and Rhythm: Normal rate and regular rhythm.   Pulmonary:      Breath sounds: Normal breath sounds.   Abdominal:      General: Abdomen is flat. Bowel sounds are normal.      Palpations: Abdomen is soft.   Musculoskeletal:      Right lower leg: No edema.      Left lower leg: No edema.   Skin:     Findings: Rash present.      Comments: Patient still has intermittent waxing and waning petechial rash.  Currently its on both upper forearms but most recently was on the entire side flank and abdominal wall which is now fading.   Neurological:      Mental Status: He is alert.           Assessment/Plan   Problem List Items Addressed This Visit       Anxiety disorder     Patient does have a generalized anxiety disorder and is on alprazolam scheduled which is working well.  So far the steroids have not caused any rage induced issues or worsening anxiety but we will continue to follow.  After reviewing the state database patient was given a refill on his alprazolam         Relevant Medications    ALPRAZolam (Xanax) 0.5 mg tablet    Hypertension - Primary     Blood pressure is elevated today and I did not get a chance to recheck it so I will follow-up and watch his blood pressure readings at his nephrologist appointment.         Relevant Orders    Lipid panel    Petechiae    Henoch-Schonlein purpura     Patient is now on high-dose steroids for an indefinite period of time through his nephrologist for treatment of his vasculitis.  We did discuss weight gain elevated sugars and potential steroid-induced diabetes.  If necessary I will start him on insulin.           IgA mediated leukocytoclastic vasculitis  (CMS-[facility])              It has been a pleasure seeing you.  Audrey Mancia MD

## 2025-04-14 NOTE — ASSESSMENT & PLAN NOTE
Patient does have a generalized anxiety disorder and is on alprazolam scheduled which is working well.  So far the steroids have not caused any rage induced issues or worsening anxiety but we will continue to follow.  After reviewing the state database patient was given a refill on his alprazolam

## 2025-04-14 NOTE — PATIENT INSTRUCTIONS
Get fasting labs    Follow up Dr Mancia the week before 7/17/25 30 min appointment for new CSA (7/12 to 7/17)

## 2025-05-08 ENCOUNTER — APPOINTMENT (OUTPATIENT)
Dept: NEPHROLOGY | Facility: CLINIC | Age: 45
End: 2025-05-08
Payer: COMMERCIAL

## 2025-05-29 ENCOUNTER — APPOINTMENT (OUTPATIENT)
Dept: NEPHROLOGY | Facility: CLINIC | Age: 45
End: 2025-05-29
Payer: COMMERCIAL

## 2025-06-20 ENCOUNTER — TELEPHONE (OUTPATIENT)
Dept: PRIMARY CARE | Facility: CLINIC | Age: 45
End: 2025-06-20
Payer: COMMERCIAL

## 2025-06-20 LAB
ALBUMIN SERPL-MCNC: 4.2 G/DL (ref 3.6–5.1)
ALBUMIN/CREAT UR: 674 MG/G CREAT
ALP SERPL-CCNC: 58 U/L (ref 36–130)
ALT SERPL-CCNC: 29 U/L (ref 9–46)
ANION GAP SERPL CALCULATED.4IONS-SCNC: 9 MMOL/L (CALC) (ref 7–17)
APPEARANCE UR: CLEAR
AST SERPL-CCNC: 18 U/L (ref 10–40)
BACTERIA #/AREA URNS HPF: ABNORMAL /HPF
BILIRUB SERPL-MCNC: 0.8 MG/DL (ref 0.2–1.2)
BILIRUB UR QL STRIP: NEGATIVE
BUN SERPL-MCNC: 28 MG/DL (ref 7–25)
CALCIUM SERPL-MCNC: 9.1 MG/DL (ref 8.6–10.3)
CAOX CRY #/AREA URNS HPF: ABNORMAL /HPF
CHLORIDE SERPL-SCNC: 103 MMOL/L (ref 98–110)
CHOLEST SERPL-MCNC: 296 MG/DL
CHOLEST/HDLC SERPL: 6.3 (CALC)
CO2 SERPL-SCNC: 27 MMOL/L (ref 20–32)
COLOR UR: ABNORMAL
CREAT SERPL-MCNC: 1.07 MG/DL (ref 0.6–1.29)
CREAT UR-MCNC: 287 MG/DL (ref 20–320)
EGFRCR SERPLBLD CKD-EPI 2021: 87 ML/MIN/1.73M2
GLUCOSE SERPL-MCNC: 97 MG/DL (ref 65–99)
GLUCOSE UR QL STRIP: NEGATIVE
HDLC SERPL-MCNC: 47 MG/DL
HGB UR QL STRIP: ABNORMAL
HYALINE CASTS #/AREA URNS LPF: ABNORMAL /LPF
KETONES UR QL STRIP: NEGATIVE
LDLC SERPL CALC-MCNC: 197 MG/DL (CALC)
LEUKOCYTE ESTERASE UR QL STRIP: NEGATIVE
MICROALBUMIN UR-MCNC: 193.4 MG/DL
NITRITE UR QL STRIP: NEGATIVE
NONHDLC SERPL-MCNC: 249 MG/DL (CALC)
PH UR STRIP: ABNORMAL [PH] (ref 5–8)
POTASSIUM SERPL-SCNC: 3.5 MMOL/L (ref 3.5–5.3)
PROT SERPL-MCNC: 6.5 G/DL (ref 6.1–8.1)
PROT UR QL STRIP: ABNORMAL
RBC #/AREA URNS HPF: ABNORMAL /HPF
SERVICE CMNT-IMP: ABNORMAL
SODIUM SERPL-SCNC: 139 MMOL/L (ref 135–146)
SP GR UR STRIP: 1.04 (ref 1–1.03)
SQUAMOUS #/AREA URNS HPF: ABNORMAL /HPF
TRIGL SERPL-MCNC: 292 MG/DL
WBC #/AREA URNS HPF: ABNORMAL /HPF

## 2025-06-20 NOTE — TELEPHONE ENCOUNTER
"Pt to restroom, voids, states when wiping there was more brown blood on toilet paper, denies pink/bright red blood and denies any clot noted. States \"feeling tightening less often, but when it does tighten it is same intensity as before\". Will continue to monitor.  " ----- Message from Audrey Mancia sent at 6/20/2025  7:44 AM EDT -----  Please notify patient his cholesterol is very high.  His LDL or bad cholesterol is now at 197 which is in a dangerous range.  Please encourage him to work on a low-fat diet and we will discuss this at his appointment in July as I believe he needs to be on medications for his cholesterol.  ----- Message -----  From: [facility] [employer] [facility]  Sent: 6/20/2025   2:25 AM EDT  To: Audrey Mancia MD

## 2025-06-23 ENCOUNTER — APPOINTMENT (OUTPATIENT)
Dept: NEPHROLOGY | Facility: CLINIC | Age: 45
End: 2025-06-23
Payer: COMMERCIAL

## 2025-06-23 VITALS
WEIGHT: 281.2 LBS | DIASTOLIC BLOOD PRESSURE: 82 MMHG | SYSTOLIC BLOOD PRESSURE: 140 MMHG | HEART RATE: 93 BPM | HEIGHT: 69 IN | BODY MASS INDEX: 41.65 KG/M2

## 2025-06-23 DIAGNOSIS — R31.29 OTHER MICROSCOPIC HEMATURIA: ICD-10-CM

## 2025-06-23 DIAGNOSIS — D69.0 HENOCH-SCHONLEIN PURPURA: ICD-10-CM

## 2025-06-23 DIAGNOSIS — R80.1 PERSISTENT PROTEINURIA: ICD-10-CM

## 2025-06-23 DIAGNOSIS — I10 PRIMARY HYPERTENSION: ICD-10-CM

## 2025-06-23 DIAGNOSIS — D69.0 IGA MEDIATED LEUKOCYTOCLASTIC VASCULITIS: Primary | ICD-10-CM

## 2025-06-23 PROCEDURE — 3008F BODY MASS INDEX DOCD: CPT | Performed by: INTERNAL MEDICINE

## 2025-06-23 PROCEDURE — 99214 OFFICE O/P EST MOD 30 MIN: CPT | Performed by: INTERNAL MEDICINE

## 2025-06-23 PROCEDURE — 3077F SYST BP >= 140 MM HG: CPT | Performed by: INTERNAL MEDICINE

## 2025-06-23 PROCEDURE — 3079F DIAST BP 80-89 MM HG: CPT | Performed by: INTERNAL MEDICINE

## 2025-06-23 RX ORDER — PREDNISONE 20 MG/1
40 TABLET ORAL DAILY
Qty: 180 TABLET | Refills: 3 | Status: SHIPPED | OUTPATIENT
Start: 2025-06-23 | End: 2026-06-23

## 2025-06-23 ASSESSMENT — ENCOUNTER SYMPTOMS
RESPIRATORY NEGATIVE: 1
ALLERGIC/IMMUNOLOGIC NEGATIVE: 1
PSYCHIATRIC NEGATIVE: 1
ENDOCRINE NEGATIVE: 1
NEUROLOGICAL NEGATIVE: 1
UNEXPECTED WEIGHT CHANGE: 1
GASTROINTESTINAL NEGATIVE: 1
MUSCULOSKELETAL NEGATIVE: 1
EYES NEGATIVE: 1
HEMATOLOGIC/LYMPHATIC NEGATIVE: 1
CARDIOVASCULAR NEGATIVE: 1

## 2025-06-23 NOTE — PROGRESS NOTES
Subjective   Has gained 27 lbs.    On 60 of steroid  Feels great otherwise.    Patient ID: Obed Uribe is a 45 y.o. male who presents for Follow-up (1 month/Review labs 6/19).  HPI  He is here for follow-up with a history of IgA vasculitis and at last visit he had a rash pretty much everywhere he has a history of Henoch-Schönlein purpura and had macroalbuminuria.  He did not want a repeat biopsy and so at last visit we started him on prednisone and CellCept along with Protonix  Labs were completed on June 19  Lipid panel shows a cholesterol 296 triglycerides 292   Metabolic panel shows a BUN of 28 a creatinine of 1.07 electrolytes look pretty good LFTs also look good  Glucose was 97  Urine albumin creatinine ratio is 674.  This is decreased from 877 3 months ago.  Urinalysis shows positive blood positive protein he has 3-10 red blood cells in the urine this is the same as it was 3 months ago  Medications are reviewed he is on amlodipine vitamin D lisinopril metoprolol CellCept a PPI prednisone  Blood pressure today is 140/82  Review of Systems   Constitutional:  Positive for unexpected weight change.   HENT: Negative.     Eyes: Negative.    Respiratory: Negative.     Cardiovascular: Negative.    Gastrointestinal: Negative.    Endocrine: Negative.    Genitourinary: Negative.    Musculoskeletal: Negative.    Skin: Negative.    Allergic/Immunologic: Negative.    Neurological: Negative.    Hematological: Negative.    Psychiatric/Behavioral: Negative.         Objective   Physical Exam  Constitutional:       Appearance: Normal appearance.   HENT:      Head: Normocephalic and atraumatic.      Right Ear: External ear normal.      Left Ear: External ear normal.      Nose: Nose normal.      Mouth/Throat:      Mouth: Mucous membranes are moist.      Pharynx: Oropharynx is clear.   Eyes:      Extraocular Movements: Extraocular movements intact.      Conjunctiva/sclera: Conjunctivae normal.      Pupils: Pupils are  equal, round, and reactive to light.   Cardiovascular:      Rate and Rhythm: Normal rate and regular rhythm.   Pulmonary:      Effort: Pulmonary effort is normal.      Breath sounds: Normal breath sounds.   Abdominal:      General: Abdomen is flat.      Palpations: Abdomen is soft.   Skin:     General: Skin is warm and dry.   Neurological:      General: No focal deficit present.      Mental Status: He is alert and oriented to person, place, and time.   Psychiatric:         Mood and Affect: Mood normal.         Behavior: Behavior normal.         Assessment/Plan   Problem List Items Addressed This Visit           ICD-10-CM    Hypertension I10    Henoch-Schonlein purpura D69.0    Persistent proteinuria R80.1    Hematuria R31.9    IgA mediated leukocytoclastic vasculitis - Primary D69.0    Relevant Medications    predniSONE (Deltasone) 20 mg tablet    Other Relevant Orders    Comprehensive metabolic panel    Albumin-Creatinine Ratio, Urine Random    Urinalysis with Reflex Microscopic    Follow Up In Nephrology   Plan:   Drop Prednisone to 40 mg per day.  2 tabs  1 month from today as long as no symptoms then 20 mg per day.  1 month from then as long as no symptoms 10 mg per day.  Then recheck labs in 3 months.    BP is great.  Proteinuria is less.  Labs are stable.  Feels great.    Call with any issues.        History of Henoch-Schönlein purpura  IgA vasculitis: Possible kidney involvement  Macroalbuminuria of 877 > 674  Hematuria  Hypertension    Evens Rice DO 06/23/25 2:56 PM

## 2025-07-08 ENCOUNTER — TELEPHONE (OUTPATIENT)
Dept: PRIMARY CARE | Facility: CLINIC | Age: 45
End: 2025-07-08

## 2025-07-08 ENCOUNTER — APPOINTMENT (OUTPATIENT)
Dept: PRIMARY CARE | Facility: CLINIC | Age: 45
End: 2025-07-08
Payer: COMMERCIAL

## 2025-07-08 VITALS
HEIGHT: 69 IN | DIASTOLIC BLOOD PRESSURE: 80 MMHG | WEIGHT: 282.8 LBS | BODY MASS INDEX: 41.89 KG/M2 | SYSTOLIC BLOOD PRESSURE: 128 MMHG | HEART RATE: 69 BPM | OXYGEN SATURATION: 96 %

## 2025-07-08 DIAGNOSIS — R73.01 ELEVATED FASTING GLUCOSE: Primary | ICD-10-CM

## 2025-07-08 DIAGNOSIS — F41.0 PANIC ATTACKS: ICD-10-CM

## 2025-07-08 DIAGNOSIS — E78.2 MIXED HYPERLIPIDEMIA: ICD-10-CM

## 2025-07-08 DIAGNOSIS — D69.0 IGA MEDIATED LEUKOCYTOCLASTIC VASCULITIS: ICD-10-CM

## 2025-07-08 DIAGNOSIS — D69.0 HENOCH-SCHONLEIN PURPURA: ICD-10-CM

## 2025-07-08 DIAGNOSIS — R73.09 ELEVATED GLUCOSE: ICD-10-CM

## 2025-07-08 DIAGNOSIS — I10 PRIMARY HYPERTENSION: ICD-10-CM

## 2025-07-08 DIAGNOSIS — F41.1 GENERALIZED ANXIETY DISORDER: ICD-10-CM

## 2025-07-08 DIAGNOSIS — E78.1 HYPERTRIGLYCERIDEMIA: ICD-10-CM

## 2025-07-08 PROCEDURE — 3079F DIAST BP 80-89 MM HG: CPT | Performed by: INTERNAL MEDICINE

## 2025-07-08 PROCEDURE — 99214 OFFICE O/P EST MOD 30 MIN: CPT | Performed by: INTERNAL MEDICINE

## 2025-07-08 PROCEDURE — 3008F BODY MASS INDEX DOCD: CPT | Performed by: INTERNAL MEDICINE

## 2025-07-08 PROCEDURE — 3074F SYST BP LT 130 MM HG: CPT | Performed by: INTERNAL MEDICINE

## 2025-07-08 RX ORDER — ALPRAZOLAM 0.5 MG/1
0.5 TABLET ORAL 2 TIMES DAILY
Qty: 180 TABLET | Refills: 0 | Status: SHIPPED | OUTPATIENT
Start: 2025-07-08

## 2025-07-08 RX ORDER — ROSUVASTATIN CALCIUM 10 MG/1
10 TABLET, COATED ORAL DAILY
Qty: 100 TABLET | Refills: 3 | Status: SHIPPED | OUTPATIENT
Start: 2025-07-08 | End: 2026-08-12

## 2025-07-08 ASSESSMENT — ENCOUNTER SYMPTOMS
FATIGUE: 0
NAUSEA: 0
TROUBLE SWALLOWING: 0
DYSURIA: 0
LIGHT-HEADEDNESS: 0
DIARRHEA: 0
SORE THROAT: 0
SHORTNESS OF BREATH: 0
ABDOMINAL PAIN: 0
CONSTIPATION: 0
ARTHRALGIAS: 0
DIZZINESS: 0
FEVER: 0
VOMITING: 0
COUGH: 0
PALPITATIONS: 0
FREQUENCY: 0

## 2025-07-08 ASSESSMENT — PAIN SCALES - GENERAL: PAINLEVEL_OUTOF10: 0-NO PAIN

## 2025-07-08 NOTE — ASSESSMENT & PLAN NOTE
Patient is being managed by Dr. Rice nephrology with prednisone.  They are starting to do a taper this summer in hopes of getting him off in the next few months

## 2025-07-08 NOTE — ASSESSMENT & PLAN NOTE
Hypertension is stable at this time.  Blood pressure is mildly elevated but he just took his prednisone and generally his blood pressure goes up shortly after taking his steroids every day    Blood pressure readings at the nephrologist are stable as well

## 2025-07-08 NOTE — PROGRESS NOTES
OARRS:  Audrey Mancia MD on 7/8/2025  8:27 AM  I have personally reviewed the OARRS report for Obed Uribe. I have considered the risks of abuse, dependence, addiction and diversion    Is the patient prescribed a combination of a benzodiazepine and opioid?  No    Last Urine Drug Screen / ordered today: Yes  Recent Results (from the past 8760 hours)   Confirmation Opiate/Opioid/Benzo Prescription Compliance    Collection Time: 10/14/24 12:09 PM   Result Value Ref Range    Clonazepam <25 <25 ng/mL    7-Aminoclonazepam <25 <25 ng/mL    Alprazolam <25 <25 ng/mL    Alpha-Hydroxyalprazolam <25 <25 ng/mL    Midazolam <25 <25 ng/mL    Alpha-Hydroxymidazolam <25 <25 ng/mL    Chlordiazepoxide <25 <25 ng/mL    Diazepam <25 <25 ng/mL    Nordiazepam <25 <25 ng/mL    Temazepam <25 <25 ng/mL    Oxazepam <25 <25 ng/mL    Lorazepam <25 <25 ng/mL    Methadone <25 <25 ng/mL    EDDP <25 <25 ng/mL    6-Acetylmorphine <25 <25 ng/mL    Codeine <50 <50 ng/mL    Hydrocodone <25 <25 ng/mL    Hydromorphone <25 <25 ng/mL    Morphine  <50 <50 ng/mL    Norhydrocodone <25 <25 ng/mL    Noroxycodone <25 <25 ng/mL    Oxycodone <25 <25 ng/mL    Oxymorphone <25 <25 ng/mL    Fentanyl <2.5 <2.5 ng/mL    Norfentanyl <2.5 <2.5 ng/mL    Tramadol <50 <50 ng/mL    O-Desmethyltramadol <50 <50 ng/mL    Zolpidem <25 <25 ng/mL    Zolpidem Metabolite (ZCA) <25 <25 ng/mL   Screen Opiate/Opioid/Benzo Prescription Compliance    Collection Time: 10/14/24 12:09 PM   Result Value Ref Range    Creatinine, Urine Random 34.7 20.0 - 370.0 mg/dL    Amphetamine Screen, Urine Presumptive Negative Presumptive Negative    Barbiturate Screen, Urine Presumptive Negative Presumptive Negative    Cannabinoid Screen, Urine Presumptive Negative Presumptive Negative    Cocaine Metabolite Screen, Urine Presumptive Negative Presumptive Negative    PCP Screen, Urine Presumptive Negative Presumptive Negative     Results are as expected.     Clinical rationale for not completing a  Urine Drug Screen: it was done 10/14/24      Controlled Substance Agreement:  Date of the Last Agreement: 7/22/24  Reviewed Controlled Substance Agreement including but not limited to the benefits, risks, and alternatives to treatment with a Controlled Substance medication(s).    Benzodiazepines:  What is the patient's goal of therapy? To help remain calm  Is this being achieved with current treatment? Yes      LAMINE-7:  No data recorded    Activities of Daily Living:   Is your overall impression that this patient is benefiting (symptom reduction outweighs side effects) from benzodiazepine therapy? Yes     1. Physical Functioning: Same  2. Family Relationship: Better  3. Social Relationship: Better  4. Mood: Better  5. Sleep Patterns: Better  6. Overall Function: BetterSubjective   Patient ID: Obed Uribe is a 45 y.o. male who presents for follow up for CSA and general health management.    Patient is here today for hypertension, Henoch- Schonheim, which is an IgA Leukoplast Sleek vasculitis and generalized anxiety disorder.  Patient has a controlled substance agreement for his alprazolam which he uses for anxiety and is working well.  He remains on prednisone through his nephrologist Dr. Rice.  He just took steroids this morning which may account for the mild elevation in his blood pressure.        Review of Systems   Constitutional:  Negative for fatigue and fever.   HENT:  Negative for sore throat and trouble swallowing.    Eyes:  Negative for visual disturbance.   Respiratory:  Negative for cough and shortness of breath.    Cardiovascular:  Negative for chest pain, palpitations and leg swelling.   Gastrointestinal:  Negative for abdominal pain, constipation, diarrhea, nausea and vomiting.   Genitourinary:  Negative for dysuria and frequency.   Musculoskeletal:  Negative for arthralgias.   Skin:  Negative for rash.   Neurological:  Negative for dizziness and light-headedness.       Objective   Medication  "Documentation Review Audit       Reviewed by Audrey Mancia MD (Physician) on 25 at 0820      Medication Order Taking? Sig Documenting Provider Last Dose Status   ALPRAZolam (Xanax) 0.5 mg tablet [id number] Yes Take 1 tablet (0.5 mg) by mouth 2 times a day. Audrey Mancia MD  Active   amLODIPine (Norvasc) 10 mg tablet [id number] Yes TAKE 1 TABLET BY MOUTH ONCE DAILY AS DIRECTED Audrey Mancia MD  Active   azelastine (Astelin) 137 mcg (0.1 %) nasal spray [id number]  Administer into affected nostril(s).   Patient not taking: Reported on 2025    Historical Provider, MD  Active   cholecalciferol (Vitamin D-3) 125 MCG (5000 UT) capsule [id number] Yes Take 1 capsule (125 mcg) by mouth once daily. Historical Provider, MD  Active   hydrOXYzine HCL (Atarax) 25 mg tablet [id number]  Take 1 tablet (25 mg) by mouth 3 times a day.   Patient not taking: Reported on 2025    Audrey Mancia MD   25 2359   lisinopril 20 mg tablet [id number] Yes TAKE 1 TABLET BY MOUTH EVERY DAY Audrey Mancia MD  Active   metoprolol succinate XL (Toprol-XL) 100 mg 24 hr tablet [id number] Yes Take 1 tablet (100 mg) by mouth once daily. Audrey Mancia MD  Active   mycophenolate (CellCept) 500 mg tablet [id number] Yes Take 1 tablet (500 mg) by mouth 2 times a day. Evens Rice DO  Active   pantoprazole (Protonix) 40 mg EC tablet [id number] Yes Take 1 tablet (40 mg) by mouth once daily in the morning. Take before meals. Do not crush, chew, or split. Evens Rice DO  Active   predniSONE (Deltasone) 20 mg tablet [id number] Yes Take 2 tablets (40 mg) by mouth once daily. Evens Rice DO  Active   sertraline (Zoloft) 100 mg tablet [id number] Yes TAKE 1 TABLET BY MOUTH EVERY DAY Audrey Mancia MD  Active   sildenafil (Viagra) 100 mg tablet [id number] Yes TAKE 1 TABLET BY MOUTH AS NEEDED FOR ERECTILE DYSFUNCTION Audrey Mancia MD  Active                  Allergies[1]    /80   Pulse 69   Ht 1.753 m (5' 9\")   Wt 128 kg (282 lb " 12.8 oz)   SpO2 96%   BMI 41.76 kg/m²     Physical Exam  Constitutional:       Appearance: Normal appearance. He is obese.   HENT:      Head: Normocephalic and atraumatic.      Nose: Nose normal.   Eyes:      Extraocular Movements: Extraocular movements intact.      Pupils: Pupils are equal, round, and reactive to light.   Cardiovascular:      Rate and Rhythm: Normal rate and regular rhythm.   Pulmonary:      Breath sounds: Normal breath sounds.   Abdominal:      General: Abdomen is flat. Bowel sounds are normal.      Palpations: Abdomen is soft.   Musculoskeletal:      Right lower leg: No edema.      Left lower leg: No edema.   Neurological:      Mental Status: He is alert.           Assessment/Plan   Problem List Items Addressed This Visit       Anxiety disorder    After reviewing the state database patient was given a refill on the alprazolam.  He will need a new controlled substance agreement before his next refill         Relevant Medications    ALPRAZolam (Xanax) 0.5 mg tablet    Hypertension    Hypertension is stable at this time.  Blood pressure is mildly elevated but he just took his prednisone and generally his blood pressure goes up shortly after taking his steroids every day    Blood pressure readings at the nephrologist are stable as well         Hypertriglyceridemia    Relevant Medications    rosuvastatin (Crestor) 10 mg tablet    Other Relevant Orders    Lipid panel    Comprehensive metabolic panel    Hemoglobin A1C    Panic attacks    Henoch-Schonlein purpura    IgA mediated leukocytoclastic vasculitis    Patient is being managed by Dr. Rice nephrology with prednisone.  They are starting to do a taper this summer in hopes of getting him off in the next few months         Mixed hyperlipidemia    Patient has a mixed hyperlipidemia but his LDL is quite high at 197.  We had a discussion today and he is going to start rosuvastatin 10 mg daily.  We will repeat his labs in 2 to 3 months or before his  next appointment.         Relevant Medications    rosuvastatin (Crestor) 10 mg tablet    Other Relevant Orders    Lipid panel    Comprehensive metabolic panel    Hemoglobin A1C    Elevated glucose    Patient's sugar levels and triglycerides are both elevated.  He may be developing steroid-induced diabetes.  At this time I will check a fasting glucose and A1c before his next appointment or with his next set of labs.          Other Visit Diagnoses         Elevated fasting glucose    -  Primary    Relevant Medications    rosuvastatin (Crestor) 10 mg tablet    Other Relevant Orders    Lipid panel    Comprehensive metabolic panel    Hemoglobin A1C                   It has been a pleasure seeing you.  Audrey Mancia MD         [1] No Known Allergies

## 2025-07-08 NOTE — ASSESSMENT & PLAN NOTE
Patient has a mixed hyperlipidemia but his LDL is quite high at 197.  We had a discussion today and he is going to start rosuvastatin 10 mg daily.  We will repeat his labs in 2 to 3 months or before his next appointment.

## 2025-07-08 NOTE — ASSESSMENT & PLAN NOTE
After reviewing the state database patient was given a refill on the alprazolam.  He will need a new controlled substance agreement before his next refill

## 2025-07-08 NOTE — ASSESSMENT & PLAN NOTE
Patient's sugar levels and triglycerides are both elevated.  He may be developing steroid-induced diabetes.  At this time I will check a fasting glucose and A1c before his next appointment or with his next set of labs.

## 2025-07-15 ENCOUNTER — APPOINTMENT (OUTPATIENT)
Dept: PRIMARY CARE | Facility: CLINIC | Age: 45
End: 2025-07-15
Payer: COMMERCIAL

## 2025-08-04 DIAGNOSIS — I15.9 SECONDARY HYPERTENSION: ICD-10-CM

## 2025-08-04 DIAGNOSIS — I10 HYPERTENSION, UNSPECIFIED TYPE: ICD-10-CM

## 2025-08-04 RX ORDER — AMLODIPINE BESYLATE 10 MG/1
10 TABLET ORAL DAILY
Qty: 90 TABLET | Refills: 3 | Status: SHIPPED | OUTPATIENT
Start: 2025-08-04

## 2025-08-04 RX ORDER — METOPROLOL SUCCINATE 100 MG/1
100 TABLET, EXTENDED RELEASE ORAL DAILY
Qty: 90 TABLET | Refills: 3 | Status: SHIPPED | OUTPATIENT
Start: 2025-08-04

## 2025-08-04 NOTE — TELEPHONE ENCOUNTER
amLODIPine (Norvasc) 10 mg tablet   metoprolol succinate XL (Toprol-XL) 100 mg 24 hr tablet     [facility]

## 2025-08-04 NOTE — TELEPHONE ENCOUNTER
Patient called to state that he has developed a sore throat and stayed home from work and would like to know if he can discuss this with the nurse and possibly get an antibiotic. Please call patient to discuss symptoms.     RX approved.

## 2025-09-18 ENCOUNTER — APPOINTMENT (OUTPATIENT)
Facility: CLINIC | Age: 45
End: 2025-09-18
Payer: COMMERCIAL

## 2025-09-24 ENCOUNTER — APPOINTMENT (OUTPATIENT)
Dept: NEPHROLOGY | Facility: CLINIC | Age: 45
End: 2025-09-24
Payer: COMMERCIAL

## 2025-10-09 ENCOUNTER — APPOINTMENT (OUTPATIENT)
Dept: PRIMARY CARE | Facility: CLINIC | Age: 45
End: 2025-10-09
Payer: COMMERCIAL